# Patient Record
Sex: FEMALE | Race: WHITE | Employment: OTHER | URBAN - NONMETROPOLITAN AREA
[De-identification: names, ages, dates, MRNs, and addresses within clinical notes are randomized per-mention and may not be internally consistent; named-entity substitution may affect disease eponyms.]

---

## 2017-01-01 ENCOUNTER — HOSPITAL ENCOUNTER (OUTPATIENT)
Dept: CT IMAGING | Facility: HOSPITAL | Age: 72
Discharge: HOME | End: 2017-03-14
Attending: FAMILY MEDICINE | Admitting: FAMILY MEDICINE
Payer: COMMERCIAL

## 2017-01-01 ENCOUNTER — ONCOLOGY VISIT (OUTPATIENT)
Dept: ONCOLOGY | Facility: OTHER | Age: 72
End: 2017-01-01
Attending: INTERNAL MEDICINE
Payer: COMMERCIAL

## 2017-01-01 ENCOUNTER — TELEPHONE (OUTPATIENT)
Dept: FAMILY MEDICINE | Facility: OTHER | Age: 72
End: 2017-01-01

## 2017-01-01 ENCOUNTER — TELEPHONE (OUTPATIENT)
Dept: CARE COORDINATION | Facility: OTHER | Age: 72
End: 2017-01-01

## 2017-01-01 ENCOUNTER — HOSPITAL ENCOUNTER (OUTPATIENT)
Dept: INTERVENTIONAL RADIOLOGY/VASCULAR | Facility: HOSPITAL | Age: 72
Discharge: HOME | End: 2017-04-17
Attending: FAMILY MEDICINE | Admitting: FAMILY MEDICINE
Payer: COMMERCIAL

## 2017-01-01 ENCOUNTER — HOSPITAL ENCOUNTER (OUTPATIENT)
Dept: INTERVENTIONAL RADIOLOGY/VASCULAR | Facility: HOSPITAL | Age: 72
Discharge: HOME | End: 2017-03-30
Attending: FAMILY MEDICINE | Admitting: FAMILY MEDICINE
Payer: COMMERCIAL

## 2017-01-01 ENCOUNTER — TELEPHONE (OUTPATIENT)
Dept: INTERVENTIONAL RADIOLOGY/VASCULAR | Facility: HOSPITAL | Age: 72
End: 2017-01-01

## 2017-01-01 ENCOUNTER — HOSPITAL ENCOUNTER (OUTPATIENT)
Dept: PET IMAGING | Facility: HOSPITAL | Age: 72
Discharge: HOME | End: 2017-09-21
Attending: FAMILY MEDICINE | Admitting: FAMILY MEDICINE
Payer: COMMERCIAL

## 2017-01-01 ENCOUNTER — HOSPITAL ENCOUNTER (OUTPATIENT)
Facility: HOSPITAL | Age: 72
End: 2017-01-01
Payer: COMMERCIAL

## 2017-01-01 ENCOUNTER — HOSPITAL ENCOUNTER (EMERGENCY)
Facility: HOSPITAL | Age: 72
Discharge: HOME | End: 2017-09-08
Attending: FAMILY MEDICINE | Admitting: FAMILY MEDICINE
Payer: COMMERCIAL

## 2017-01-01 ENCOUNTER — OFFICE VISIT (OUTPATIENT)
Dept: FAMILY MEDICINE | Facility: OTHER | Age: 72
End: 2017-01-01
Attending: FAMILY MEDICINE
Payer: COMMERCIAL

## 2017-01-01 ENCOUNTER — HOSPITAL ENCOUNTER (EMERGENCY)
Facility: HOSPITAL | Age: 72
Discharge: HOME | End: 2017-03-07
Attending: PHYSICIAN ASSISTANT | Admitting: PHYSICIAN ASSISTANT
Payer: COMMERCIAL

## 2017-01-01 ENCOUNTER — DOCUMENTATION ONLY (OUTPATIENT)
Dept: FAMILY MEDICINE | Facility: OTHER | Age: 72
End: 2017-01-01

## 2017-01-01 ENCOUNTER — HOSPITAL PATHOLOGY (OUTPATIENT)
Dept: OTHER | Facility: CLINIC | Age: 72
End: 2017-01-01

## 2017-01-01 ENCOUNTER — HOSPITAL ENCOUNTER (OUTPATIENT)
Dept: GENERAL RADIOLOGY | Facility: HOSPITAL | Age: 72
End: 2017-10-10
Attending: RADIOLOGY
Payer: COMMERCIAL

## 2017-01-01 ENCOUNTER — HOSPITAL ENCOUNTER (OUTPATIENT)
Dept: MRI IMAGING | Facility: HOSPITAL | Age: 72
Discharge: HOME | End: 2017-10-31
Attending: INTERNAL MEDICINE | Admitting: INTERNAL MEDICINE
Payer: COMMERCIAL

## 2017-01-01 ENCOUNTER — ONCOLOGY VISIT (OUTPATIENT)
Dept: ONCOLOGY | Facility: OTHER | Age: 72
End: 2017-01-01
Attending: FAMILY MEDICINE
Payer: COMMERCIAL

## 2017-01-01 ENCOUNTER — HOSPITAL ENCOUNTER (OUTPATIENT)
Dept: GENERAL RADIOLOGY | Facility: HOSPITAL | Age: 72
Discharge: HOME | End: 2017-03-22
Attending: FAMILY MEDICINE | Admitting: FAMILY MEDICINE
Payer: COMMERCIAL

## 2017-01-01 ENCOUNTER — HOSPITAL ENCOUNTER (OUTPATIENT)
Dept: CT IMAGING | Facility: HOSPITAL | Age: 72
Discharge: HOME | End: 2017-10-10
Attending: FAMILY MEDICINE | Admitting: FAMILY MEDICINE
Payer: COMMERCIAL

## 2017-01-01 ENCOUNTER — MEDICAL CORRESPONDENCE (OUTPATIENT)
Dept: HEALTH INFORMATION MANAGEMENT | Facility: HOSPITAL | Age: 72
End: 2017-01-01

## 2017-01-01 VITALS
SYSTOLIC BLOOD PRESSURE: 155 MMHG | OXYGEN SATURATION: 92 % | HEART RATE: 110 BPM | BODY MASS INDEX: 41.6 KG/M2 | TEMPERATURE: 98.5 F | RESPIRATION RATE: 18 BRPM | WEIGHT: 250 LBS | DIASTOLIC BLOOD PRESSURE: 100 MMHG

## 2017-01-01 VITALS
HEIGHT: 65 IN | WEIGHT: 258 LBS | TEMPERATURE: 96.8 F | DIASTOLIC BLOOD PRESSURE: 70 MMHG | HEART RATE: 103 BPM | SYSTOLIC BLOOD PRESSURE: 132 MMHG | OXYGEN SATURATION: 96 % | BODY MASS INDEX: 42.99 KG/M2 | RESPIRATION RATE: 18 BRPM

## 2017-01-01 VITALS
RESPIRATION RATE: 20 BRPM | DIASTOLIC BLOOD PRESSURE: 72 MMHG | SYSTOLIC BLOOD PRESSURE: 124 MMHG | OXYGEN SATURATION: 91 % | HEART RATE: 110 BPM

## 2017-01-01 VITALS
RESPIRATION RATE: 20 BRPM | HEART RATE: 95 BPM | BODY MASS INDEX: 42.82 KG/M2 | SYSTOLIC BLOOD PRESSURE: 137 MMHG | WEIGHT: 257 LBS | OXYGEN SATURATION: 96 % | HEIGHT: 65 IN | TEMPERATURE: 98.3 F | DIASTOLIC BLOOD PRESSURE: 74 MMHG

## 2017-01-01 VITALS
HEART RATE: 105 BPM | OXYGEN SATURATION: 95 % | TEMPERATURE: 98.1 F | DIASTOLIC BLOOD PRESSURE: 78 MMHG | SYSTOLIC BLOOD PRESSURE: 138 MMHG

## 2017-01-01 VITALS
HEART RATE: 106 BPM | RESPIRATION RATE: 18 BRPM | DIASTOLIC BLOOD PRESSURE: 58 MMHG | SYSTOLIC BLOOD PRESSURE: 154 MMHG | TEMPERATURE: 98.1 F | OXYGEN SATURATION: 91 %

## 2017-01-01 VITALS
DIASTOLIC BLOOD PRESSURE: 69 MMHG | OXYGEN SATURATION: 96 % | RESPIRATION RATE: 18 BRPM | SYSTOLIC BLOOD PRESSURE: 148 MMHG | WEIGHT: 257 LBS | HEIGHT: 65 IN | TEMPERATURE: 97.3 F | BODY MASS INDEX: 42.82 KG/M2 | HEART RATE: 107 BPM

## 2017-01-01 VITALS
DIASTOLIC BLOOD PRESSURE: 82 MMHG | RESPIRATION RATE: 94 BRPM | HEART RATE: 110 BPM | SYSTOLIC BLOOD PRESSURE: 148 MMHG | HEIGHT: 65 IN | TEMPERATURE: 96.5 F

## 2017-01-01 VITALS
RESPIRATION RATE: 18 BRPM | DIASTOLIC BLOOD PRESSURE: 103 MMHG | OXYGEN SATURATION: 94 % | TEMPERATURE: 96.6 F | SYSTOLIC BLOOD PRESSURE: 142 MMHG | HEART RATE: 98 BPM

## 2017-01-01 DIAGNOSIS — C50.912 MALIGNANT NEOPLASM OF LEFT BREAST IN FEMALE, ESTROGEN RECEPTOR POSITIVE, UNSPECIFIED SITE OF BREAST (H): Primary | ICD-10-CM

## 2017-01-01 DIAGNOSIS — C34.90 MALIGNANT NEOPLASM OF LUNG, UNSPECIFIED LATERALITY, UNSPECIFIED PART OF LUNG (H): ICD-10-CM

## 2017-01-01 DIAGNOSIS — K59.01 SLOW TRANSIT CONSTIPATION: ICD-10-CM

## 2017-01-01 DIAGNOSIS — Z53.9 ERRONEOUS ENCOUNTER--DISREGARD: Primary | ICD-10-CM

## 2017-01-01 DIAGNOSIS — R91.1 SOLITARY PULMONARY NODULE: ICD-10-CM

## 2017-01-01 DIAGNOSIS — R91.1 LUNG NODULE: Primary | ICD-10-CM

## 2017-01-01 DIAGNOSIS — R91.8 PULMONARY NODULES: ICD-10-CM

## 2017-01-01 DIAGNOSIS — R91.8 OTHER NONSPECIFIC ABNORMAL FINDING OF LUNG FIELD: ICD-10-CM

## 2017-01-01 DIAGNOSIS — R91.1 SOLITARY PULMONARY NODULE: Primary | ICD-10-CM

## 2017-01-01 DIAGNOSIS — M48.062 SPINAL STENOSIS OF LUMBAR REGION WITH NEUROGENIC CLAUDICATION: Primary | ICD-10-CM

## 2017-01-01 DIAGNOSIS — M54.50 ACUTE MIDLINE LOW BACK PAIN WITHOUT SCIATICA: Primary | ICD-10-CM

## 2017-01-01 DIAGNOSIS — R93.89 ABNORMAL CAT SCAN: Primary | ICD-10-CM

## 2017-01-01 DIAGNOSIS — R91.8 ABNORMAL CT SCAN, LUNG: Primary | ICD-10-CM

## 2017-01-01 DIAGNOSIS — M48.061 SPINAL STENOSIS, LUMBAR: Primary | ICD-10-CM

## 2017-01-01 DIAGNOSIS — R03.0 ELEVATED BLOOD PRESSURE READING WITHOUT DIAGNOSIS OF HYPERTENSION: ICD-10-CM

## 2017-01-01 DIAGNOSIS — C34.91 MALIGNANT NEOPLASM OF RIGHT LUNG, UNSPECIFIED PART OF LUNG (H): Primary | ICD-10-CM

## 2017-01-01 DIAGNOSIS — R91.1 LUNG NODULE: ICD-10-CM

## 2017-01-01 DIAGNOSIS — Z17.0 MALIGNANT NEOPLASM OF LEFT BREAST IN FEMALE, ESTROGEN RECEPTOR POSITIVE, UNSPECIFIED SITE OF BREAST (H): Primary | ICD-10-CM

## 2017-01-01 DIAGNOSIS — I25.10 CORONARY ARTERY DISEASE INVOLVING NATIVE CORONARY ARTERY OF NATIVE HEART WITHOUT ANGINA PECTORIS: ICD-10-CM

## 2017-01-01 DIAGNOSIS — M46.1 SACROILIITIS (H): ICD-10-CM

## 2017-01-01 DIAGNOSIS — Z85.3 PERSONAL HISTORY OF MALIGNANT NEOPLASM OF BREAST: ICD-10-CM

## 2017-01-01 DIAGNOSIS — C34.90 MALIGNANT NEOPLASM OF LUNG, UNSPECIFIED LATERALITY, UNSPECIFIED PART OF LUNG (H): Primary | ICD-10-CM

## 2017-01-01 DIAGNOSIS — M54.50 ACUTE MIDLINE LOW BACK PAIN WITHOUT SCIATICA: ICD-10-CM

## 2017-01-01 DIAGNOSIS — F17.200 TOBACCO DEPENDENCE SYNDROME: ICD-10-CM

## 2017-01-01 DIAGNOSIS — R60.0 PEDAL EDEMA: ICD-10-CM

## 2017-01-01 LAB
ALBUMIN SERPL-MCNC: 3.5 G/DL (ref 3.4–5)
ALBUMIN SERPL-MCNC: 3.5 G/DL (ref 3.4–5)
ALP SERPL-CCNC: 102 U/L (ref 40–150)
ALP SERPL-CCNC: 106 U/L (ref 40–150)
ALT SERPL W P-5'-P-CCNC: 56 U/L (ref 0–50)
ALT SERPL W P-5'-P-CCNC: 56 U/L (ref 0–50)
ANION GAP SERPL CALCULATED.3IONS-SCNC: 11 MMOL/L (ref 3–14)
ANION GAP SERPL CALCULATED.3IONS-SCNC: 4 MMOL/L (ref 3–14)
APTT PPP: 36 SEC (ref 24–37)
AST SERPL W P-5'-P-CCNC: 54 U/L (ref 0–45)
AST SERPL W P-5'-P-CCNC: 56 U/L (ref 0–45)
BASOPHILS # BLD AUTO: 0.1 10E9/L (ref 0–0.2)
BASOPHILS # BLD AUTO: 0.1 10E9/L (ref 0–0.2)
BASOPHILS NFR BLD AUTO: 1 %
BASOPHILS NFR BLD AUTO: 1.2 %
BILIRUB SERPL-MCNC: 0.6 MG/DL (ref 0.2–1.3)
BILIRUB SERPL-MCNC: 0.9 MG/DL (ref 0.2–1.3)
BUN SERPL-MCNC: 10 MG/DL (ref 7–30)
BUN SERPL-MCNC: 11 MG/DL (ref 7–30)
CALCIUM SERPL-MCNC: 8.8 MG/DL (ref 8.5–10.1)
CALCIUM SERPL-MCNC: 9.1 MG/DL (ref 8.5–10.1)
CANCER AG27-29 SERPL-ACNC: 60 U/ML (ref 0–39)
CEA SERPL-MCNC: 1774.1 UG/L (ref 0–2.5)
CHLORIDE SERPL-SCNC: 100 MMOL/L (ref 94–109)
CHLORIDE SERPL-SCNC: 101 MMOL/L (ref 94–109)
CLOSURE TME COLL+EPINEP BLD: NORMAL SEC
CO2 SERPL-SCNC: 27 MMOL/L (ref 20–32)
CO2 SERPL-SCNC: 33 MMOL/L (ref 20–32)
COPATH REPORT: NORMAL
COPATH REPORT: NORMAL
CREAT SERPL-MCNC: 0.6 MG/DL (ref 0.52–1.04)
CREAT SERPL-MCNC: 0.68 MG/DL (ref 0.52–1.04)
DIFFERENTIAL METHOD BLD: ABNORMAL
DIFFERENTIAL METHOD BLD: ABNORMAL
EOSINOPHIL # BLD AUTO: 0.1 10E9/L (ref 0–0.7)
EOSINOPHIL # BLD AUTO: 0.3 10E9/L (ref 0–0.7)
EOSINOPHIL NFR BLD AUTO: 1.6 %
EOSINOPHIL NFR BLD AUTO: 3.1 %
ERYTHROCYTE [DISTWIDTH] IN BLOOD BY AUTOMATED COUNT: 13.2 % (ref 10–15)
ERYTHROCYTE [DISTWIDTH] IN BLOOD BY AUTOMATED COUNT: 13.6 % (ref 10–15)
ERYTHROCYTE [DISTWIDTH] IN BLOOD BY AUTOMATED COUNT: 13.7 % (ref 10–15)
GFR SERPL CREATININE-BSD FRML MDRD: 85 ML/MIN/1.7M2
GFR SERPL CREATININE-BSD FRML MDRD: >90 ML/MIN/1.7M2
GLUCOSE SERPL-MCNC: 116 MG/DL (ref 70–99)
GLUCOSE SERPL-MCNC: 136 MG/DL (ref 70–99)
HCT VFR BLD AUTO: 45 % (ref 35–47)
HCT VFR BLD AUTO: 46.3 % (ref 35–47)
HCT VFR BLD AUTO: 46.7 % (ref 35–47)
HGB BLD-MCNC: 15.7 G/DL (ref 11.7–15.7)
HGB BLD-MCNC: 15.8 G/DL (ref 11.7–15.7)
HGB BLD-MCNC: 16.3 G/DL (ref 11.7–15.7)
IMM GRANULOCYTES # BLD: 0 10E9/L (ref 0–0.4)
IMM GRANULOCYTES # BLD: 0.1 10E9/L (ref 0–0.4)
IMM GRANULOCYTES NFR BLD: 0.2 %
IMM GRANULOCYTES NFR BLD: 0.6 %
INR PPP: 1.04 (ref 0.8–1.2)
LACTATE SERPL-SCNC: 2 MMOL/L (ref 0.4–2)
LDH SERPL L TO P-CCNC: 277 U/L (ref 81–234)
LYMPHOCYTES # BLD AUTO: 1.8 10E9/L (ref 0.8–5.3)
LYMPHOCYTES # BLD AUTO: 1.8 10E9/L (ref 0.8–5.3)
LYMPHOCYTES NFR BLD AUTO: 21.3 %
LYMPHOCYTES NFR BLD AUTO: 22 %
MCH RBC QN AUTO: 29.5 PG (ref 26.5–33)
MCH RBC QN AUTO: 29.8 PG (ref 26.5–33)
MCH RBC QN AUTO: 30.6 PG (ref 26.5–33)
MCHC RBC AUTO-ENTMCNC: 33.8 G/DL (ref 31.5–36.5)
MCHC RBC AUTO-ENTMCNC: 34.9 G/DL (ref 31.5–36.5)
MCHC RBC AUTO-ENTMCNC: 35.2 G/DL (ref 31.5–36.5)
MCV RBC AUTO: 85 FL (ref 78–100)
MCV RBC AUTO: 87 FL (ref 78–100)
MCV RBC AUTO: 87 FL (ref 78–100)
MONOCYTES # BLD AUTO: 0.5 10E9/L (ref 0–1.3)
MONOCYTES # BLD AUTO: 0.6 10E9/L (ref 0–1.3)
MONOCYTES NFR BLD AUTO: 5.8 %
MONOCYTES NFR BLD AUTO: 6.9 %
NEUTROPHILS # BLD AUTO: 5.5 10E9/L (ref 1.6–8.3)
NEUTROPHILS # BLD AUTO: 5.9 10E9/L (ref 1.6–8.3)
NEUTROPHILS NFR BLD AUTO: 66.4 %
NEUTROPHILS NFR BLD AUTO: 69.9 %
NRBC # BLD AUTO: 0 10*3/UL
NRBC # BLD AUTO: 0 10*3/UL
NRBC BLD AUTO-RTO: 0 /100
NRBC BLD AUTO-RTO: 0 /100
NT-PROBNP SERPL-MCNC: 136 PG/ML (ref 0–900)
PLATELET # BLD AUTO: 198 10E9/L (ref 150–450)
PLATELET # BLD AUTO: 204 10E9/L (ref 150–450)
PLATELET # BLD AUTO: 222 10E9/L (ref 150–450)
POTASSIUM SERPL-SCNC: 3.9 MMOL/L (ref 3.4–5.3)
POTASSIUM SERPL-SCNC: 4.1 MMOL/L (ref 3.4–5.3)
PROT SERPL-MCNC: 7.7 G/DL (ref 6.8–8.8)
PROT SERPL-MCNC: 7.8 G/DL (ref 6.8–8.8)
RBC # BLD AUTO: 5.27 10E12/L (ref 3.8–5.2)
RBC # BLD AUTO: 5.33 10E12/L (ref 3.8–5.2)
RBC # BLD AUTO: 5.35 10E12/L (ref 3.8–5.2)
SODIUM SERPL-SCNC: 137 MMOL/L (ref 133–144)
SODIUM SERPL-SCNC: 139 MMOL/L (ref 133–144)
TROPONIN I SERPL-MCNC: <0.015 UG/L (ref 0–0.04)
WBC # BLD AUTO: 8.3 10E9/L (ref 4–11)
WBC # BLD AUTO: 8.5 10E9/L (ref 4–11)
WBC # BLD AUTO: 9.2 10E9/L (ref 4–11)

## 2017-01-01 PROCEDURE — 99285 EMERGENCY DEPT VISIT HI MDM: CPT | Performed by: FAMILY MEDICINE

## 2017-01-01 PROCEDURE — A9552 F18 FDG: HCPCS | Mod: TC

## 2017-01-01 PROCEDURE — 80053 COMPREHEN METABOLIC PANEL: CPT | Mod: ZL | Performed by: INTERNAL MEDICINE

## 2017-01-01 PROCEDURE — 99214 OFFICE O/P EST MOD 30 MIN: CPT | Performed by: FAMILY MEDICINE

## 2017-01-01 PROCEDURE — 99000 SPECIMEN HANDLING OFFICE-LAB: CPT | Performed by: INTERNAL MEDICINE

## 2017-01-01 PROCEDURE — 36415 COLL VENOUS BLD VENIPUNCTURE: CPT | Performed by: RADIOLOGY

## 2017-01-01 PROCEDURE — 85025 COMPLETE CBC W/AUTO DIFF WBC: CPT | Performed by: FAMILY MEDICINE

## 2017-01-01 PROCEDURE — 99212 OFFICE O/P EST SF 10 MIN: CPT

## 2017-01-01 PROCEDURE — 88341 IMHCHEM/IMCYTCHM EA ADD ANTB: CPT | Mod: TC | Performed by: FAMILY MEDICINE

## 2017-01-01 PROCEDURE — 99213 OFFICE O/P EST LOW 20 MIN: CPT | Performed by: PHYSICIAN ASSISTANT

## 2017-01-01 PROCEDURE — 88173 CYTOPATH EVAL FNA REPORT: CPT | Mod: TC | Performed by: FAMILY MEDICINE

## 2017-01-01 PROCEDURE — 72100 X-RAY EXAM L-S SPINE 2/3 VWS: CPT | Mod: TC

## 2017-01-01 PROCEDURE — 36415 COLL VENOUS BLD VENIPUNCTURE: CPT | Performed by: FAMILY MEDICINE

## 2017-01-01 PROCEDURE — 25000128 H RX IP 250 OP 636: Performed by: RADIOLOGY

## 2017-01-01 PROCEDURE — 93005 ELECTROCARDIOGRAM TRACING: CPT

## 2017-01-01 PROCEDURE — 72131 CT LUMBAR SPINE W/O DYE: CPT | Mod: TC

## 2017-01-01 PROCEDURE — 85730 THROMBOPLASTIN TIME PARTIAL: CPT | Performed by: RADIOLOGY

## 2017-01-01 PROCEDURE — 83615 LACTATE (LD) (LDH) ENZYME: CPT | Mod: ZL | Performed by: INTERNAL MEDICINE

## 2017-01-01 PROCEDURE — 99213 OFFICE O/P EST LOW 20 MIN: CPT

## 2017-01-01 PROCEDURE — 85025 COMPLETE CBC W/AUTO DIFF WBC: CPT | Mod: ZL | Performed by: INTERNAL MEDICINE

## 2017-01-01 PROCEDURE — 62323 NJX INTERLAMINAR LMBR/SAC: CPT | Mod: TC

## 2017-01-01 PROCEDURE — 99212 OFFICE O/P EST SF 10 MIN: CPT | Mod: TC

## 2017-01-01 PROCEDURE — 93010 ELECTROCARDIOGRAM REPORT: CPT | Performed by: INTERNAL MEDICINE

## 2017-01-01 PROCEDURE — 88360 TUMOR IMMUNOHISTOCHEM/MANUAL: CPT | Mod: TC | Performed by: FAMILY MEDICINE

## 2017-01-01 PROCEDURE — 85027 COMPLETE CBC AUTOMATED: CPT | Performed by: RADIOLOGY

## 2017-01-01 PROCEDURE — 84484 ASSAY OF TROPONIN QUANT: CPT | Performed by: FAMILY MEDICINE

## 2017-01-01 PROCEDURE — 88341 IMHCHEM/IMCYTCHM EA ADD ANTB: CPT | Mod: TC,59 | Performed by: FAMILY MEDICINE

## 2017-01-01 PROCEDURE — 74177 CT ABD & PELVIS W/CONTRAST: CPT | Mod: TC

## 2017-01-01 PROCEDURE — 99205 OFFICE O/P NEW HI 60 MIN: CPT | Performed by: INTERNAL MEDICINE

## 2017-01-01 PROCEDURE — 83880 ASSAY OF NATRIURETIC PEPTIDE: CPT | Performed by: FAMILY MEDICINE

## 2017-01-01 PROCEDURE — 78815 PET IMAGE W/CT SKULL-THIGH: CPT | Mod: TC

## 2017-01-01 PROCEDURE — 99213 OFFICE O/P EST LOW 20 MIN: CPT | Performed by: FAMILY MEDICINE

## 2017-01-01 PROCEDURE — 25000128 H RX IP 250 OP 636: Performed by: FAMILY MEDICINE

## 2017-01-01 PROCEDURE — 70553 MRI BRAIN STEM W/O & W/DYE: CPT | Mod: TC

## 2017-01-01 PROCEDURE — 85610 PROTHROMBIN TIME: CPT | Performed by: RADIOLOGY

## 2017-01-01 PROCEDURE — 96361 HYDRATE IV INFUSION ADD-ON: CPT

## 2017-01-01 PROCEDURE — 88342 IMHCHEM/IMCYTCHM 1ST ANTB: CPT | Mod: TC | Performed by: FAMILY MEDICINE

## 2017-01-01 PROCEDURE — 99285 EMERGENCY DEPT VISIT HI MDM: CPT | Mod: 25

## 2017-01-01 PROCEDURE — 99212 OFFICE O/P EST SF 10 MIN: CPT | Mod: 25

## 2017-01-01 PROCEDURE — 86300 IMMUNOASSAY TUMOR CA 15-3: CPT | Mod: ZL | Performed by: INTERNAL MEDICINE

## 2017-01-01 PROCEDURE — 83605 ASSAY OF LACTIC ACID: CPT | Performed by: FAMILY MEDICINE

## 2017-01-01 PROCEDURE — 88305 TISSUE EXAM BY PATHOLOGIST: CPT | Mod: TC | Performed by: FAMILY MEDICINE

## 2017-01-01 PROCEDURE — 00000155 ZZHCL STATISTIC H-CELL BLOCK W/STAIN: Performed by: FAMILY MEDICINE

## 2017-01-01 PROCEDURE — 99215 OFFICE O/P EST HI 40 MIN: CPT | Performed by: INTERNAL MEDICINE

## 2017-01-01 PROCEDURE — 25000132 ZZH RX MED GY IP 250 OP 250 PS 637: Performed by: FAMILY MEDICINE

## 2017-01-01 PROCEDURE — 85576 BLOOD PLATELET AGGREGATION: CPT | Performed by: RADIOLOGY

## 2017-01-01 PROCEDURE — 96360 HYDRATION IV INFUSION INIT: CPT | Mod: 59

## 2017-01-01 PROCEDURE — 71020 ZZHC CHEST TWO VIEWS, FRONT/LAT: CPT | Mod: TC

## 2017-01-01 PROCEDURE — 80053 COMPREHEN METABOLIC PANEL: CPT | Performed by: FAMILY MEDICINE

## 2017-01-01 PROCEDURE — A9585 GADOBUTROL INJECTION: HCPCS | Performed by: RADIOLOGY

## 2017-01-01 PROCEDURE — 36415 COLL VENOUS BLD VENIPUNCTURE: CPT | Mod: ZL | Performed by: INTERNAL MEDICINE

## 2017-01-01 PROCEDURE — 82378 CARCINOEMBRYONIC ANTIGEN: CPT | Mod: ZL | Performed by: INTERNAL MEDICINE

## 2017-01-01 RX ORDER — MORPHINE SULFATE 2 MG/ML
INJECTION, SOLUTION INTRAMUSCULAR; INTRAVENOUS
Status: COMPLETED
Start: 2017-01-01 | End: 2017-01-01

## 2017-01-01 RX ORDER — SODIUM CHLORIDE 9 MG/ML
INJECTION, SOLUTION INTRAVENOUS CONTINUOUS
Status: DISCONTINUED | OUTPATIENT
Start: 2017-01-01 | End: 2017-01-01 | Stop reason: HOSPADM

## 2017-01-01 RX ORDER — CYCLOBENZAPRINE HCL 10 MG
TABLET ORAL
Qty: 30 TABLET | Refills: 0 | Status: SHIPPED | OUTPATIENT
Start: 2017-01-01 | End: 2017-01-01

## 2017-01-01 RX ORDER — IOPAMIDOL 612 MG/ML
15 INJECTION, SOLUTION INTRATHECAL ONCE
Status: COMPLETED | OUTPATIENT
Start: 2017-01-01 | End: 2017-01-01

## 2017-01-01 RX ORDER — POLYETHYLENE GLYCOL 3350 17 G/17G
1 POWDER, FOR SOLUTION ORAL DAILY
Qty: 527 G | Refills: 0 | Status: SHIPPED | OUTPATIENT
Start: 2017-01-01 | End: 2017-01-01

## 2017-01-01 RX ORDER — METHYLPREDNISOLONE ACETATE 80 MG/ML
80 INJECTION, SUSPENSION INTRA-ARTICULAR; INTRALESIONAL; INTRAMUSCULAR; SOFT TISSUE ONCE
Status: COMPLETED | OUTPATIENT
Start: 2017-01-01 | End: 2017-01-01

## 2017-01-01 RX ORDER — IBUPROFEN 600 MG/1
600 TABLET, FILM COATED ORAL ONCE
Status: COMPLETED | OUTPATIENT
Start: 2017-01-01 | End: 2017-01-01

## 2017-01-01 RX ORDER — HYDROCODONE BITARTRATE AND ACETAMINOPHEN 5; 325 MG/1; MG/1
1 TABLET ORAL EVERY 6 HOURS PRN
Qty: 60 TABLET | Refills: 0 | Status: SHIPPED | OUTPATIENT
Start: 2017-01-01

## 2017-01-01 RX ORDER — LIDOCAINE HYDROCHLORIDE 10 MG/ML
10 INJECTION, SOLUTION EPIDURAL; INFILTRATION; INTRACAUDAL; PERINEURAL ONCE
Status: COMPLETED | OUTPATIENT
Start: 2017-01-01 | End: 2017-01-01

## 2017-01-01 RX ORDER — LIDOCAINE HYDROCHLORIDE 10 MG/ML
10 INJECTION, SOLUTION EPIDURAL; INFILTRATION; INTRACAUDAL; PERINEURAL ONCE
Status: CANCELLED | OUTPATIENT
Start: 2017-01-01

## 2017-01-01 RX ORDER — GADOBUTROL 604.72 MG/ML
10 INJECTION INTRAVENOUS ONCE
Status: COMPLETED | OUTPATIENT
Start: 2017-01-01 | End: 2017-01-01

## 2017-01-01 RX ORDER — LIDOCAINE 40 MG/G
CREAM TOPICAL
Status: CANCELLED | OUTPATIENT
Start: 2017-01-01

## 2017-01-01 RX ORDER — METHYLPREDNISOLONE ACETATE 80 MG/ML
INJECTION, SUSPENSION INTRA-ARTICULAR; INTRALESIONAL; INTRAMUSCULAR; SOFT TISSUE
Status: DISCONTINUED
Start: 2017-01-01 | End: 2017-01-01 | Stop reason: HOSPADM

## 2017-01-01 RX ORDER — LIDOCAINE HYDROCHLORIDE 10 MG/ML
INJECTION, SOLUTION EPIDURAL; INFILTRATION; INTRACAUDAL; PERINEURAL
Status: DISPENSED
Start: 2017-01-01 | End: 2017-01-01

## 2017-01-01 RX ORDER — ASPIRIN 81 MG/1
324 TABLET, CHEWABLE ORAL ONCE
Status: DISCONTINUED | OUTPATIENT
Start: 2017-01-01 | End: 2017-01-01

## 2017-01-01 RX ORDER — TRAMADOL HYDROCHLORIDE 50 MG/1
TABLET ORAL
Qty: 10 TABLET | Refills: 0 | Status: SHIPPED | OUTPATIENT
Start: 2017-01-01 | End: 2017-01-01

## 2017-01-01 RX ORDER — METHYLPREDNISOLONE ACETATE 80 MG/ML
INJECTION, SUSPENSION INTRA-ARTICULAR; INTRALESIONAL; INTRAMUSCULAR; SOFT TISSUE
Status: DISPENSED
Start: 2017-01-01 | End: 2017-01-01

## 2017-01-01 RX ORDER — IOPAMIDOL 612 MG/ML
100 INJECTION, SOLUTION INTRAVASCULAR ONCE
Status: COMPLETED | OUTPATIENT
Start: 2017-01-01 | End: 2017-01-01

## 2017-01-01 RX ORDER — HYDROCHLOROTHIAZIDE 12.5 MG/1
12.5 CAPSULE ORAL DAILY
Qty: 30 CAPSULE | Refills: 3 | Status: SHIPPED | OUTPATIENT
Start: 2017-01-01

## 2017-01-01 RX ORDER — HYDROCODONE BITARTRATE AND ACETAMINOPHEN 5; 325 MG/1; MG/1
1 TABLET ORAL EVERY 6 HOURS PRN
Qty: 60 TABLET | Refills: 0 | Status: SHIPPED | OUTPATIENT
Start: 2017-01-01 | End: 2017-01-01

## 2017-01-01 RX ORDER — LIDOCAINE 40 MG/G
CREAM TOPICAL
Status: DISCONTINUED | OUTPATIENT
Start: 2017-01-01 | End: 2017-01-01 | Stop reason: HOSPADM

## 2017-01-01 RX ORDER — HYDROCODONE BITARTRATE AND ACETAMINOPHEN 5; 325 MG/1; MG/1
1 TABLET ORAL EVERY 6 HOURS PRN
COMMUNITY
End: 2017-01-01

## 2017-01-01 RX ORDER — NITROGLYCERIN 0.4 MG/1
TABLET SUBLINGUAL
Qty: 25 TABLET | Refills: 3 | Status: SHIPPED | OUTPATIENT
Start: 2017-01-01

## 2017-01-01 RX ORDER — LIDOCAINE HYDROCHLORIDE 10 MG/ML
10 INJECTION, SOLUTION EPIDURAL; INFILTRATION; INTRACAUDAL; PERINEURAL ONCE
Status: DISCONTINUED | OUTPATIENT
Start: 2017-01-01 | End: 2017-01-01 | Stop reason: HOSPADM

## 2017-01-01 RX ADMIN — METHYLPREDNISOLONE ACETATE 80 MG: 80 INJECTION, SUSPENSION INTRA-ARTICULAR; INTRALESIONAL; INTRAMUSCULAR; SOFT TISSUE at 16:06

## 2017-01-01 RX ADMIN — GADOBUTROL 10 ML: 604.72 INJECTION INTRAVENOUS at 10:28

## 2017-01-01 RX ADMIN — IOPAMIDOL 100 ML: 612 INJECTION, SOLUTION INTRAVASCULAR at 11:48

## 2017-01-01 RX ADMIN — MORPHINE SULFATE 2 MG: 2 INJECTION, SOLUTION INTRAMUSCULAR; INTRAVENOUS at 09:21

## 2017-01-01 RX ADMIN — METHYLPREDNISOLONE ACETATE 80 MG: 80 INJECTION, SUSPENSION INTRA-ARTICULAR; INTRALESIONAL; INTRAMUSCULAR; SOFT TISSUE at 12:02

## 2017-01-01 RX ADMIN — IBUPROFEN 600 MG: 600 TABLET ORAL at 11:33

## 2017-01-01 RX ADMIN — LIDOCAINE HYDROCHLORIDE 70 MG: 10 INJECTION, SOLUTION EPIDURAL; INFILTRATION; INTRACAUDAL; PERINEURAL at 09:34

## 2017-01-01 RX ADMIN — SODIUM CHLORIDE: 9 INJECTION, SOLUTION INTRAVENOUS at 11:13

## 2017-01-01 RX ADMIN — IOPAMIDOL 3 ML: 612 INJECTION, SOLUTION INTRATHECAL at 16:06

## 2017-01-01 RX ADMIN — IOPAMIDOL 3 ML: 612 INJECTION, SOLUTION INTRATHECAL at 12:02

## 2017-01-01 ASSESSMENT — ANXIETY QUESTIONNAIRES
2. NOT BEING ABLE TO STOP OR CONTROL WORRYING: NOT AT ALL
1. FEELING NERVOUS, ANXIOUS, OR ON EDGE: NOT AT ALL
7. FEELING AFRAID AS IF SOMETHING AWFUL MIGHT HAPPEN: NOT AT ALL
GAD7 TOTAL SCORE: 0
4. TROUBLE RELAXING: NOT AT ALL
1. FEELING NERVOUS, ANXIOUS, OR ON EDGE: NOT AT ALL
5. BEING SO RESTLESS THAT IT IS HARD TO SIT STILL: NOT AT ALL
GAD7 TOTAL SCORE: 0
IF YOU CHECKED OFF ANY PROBLEMS ON THIS QUESTIONNAIRE, HOW DIFFICULT HAVE THESE PROBLEMS MADE IT FOR YOU TO DO YOUR WORK, TAKE CARE OF THINGS AT HOME, OR GET ALONG WITH OTHER PEOPLE: NOT DIFFICULT AT ALL
3. WORRYING TOO MUCH ABOUT DIFFERENT THINGS: NOT AT ALL
5. BEING SO RESTLESS THAT IT IS HARD TO SIT STILL: NOT AT ALL
7. FEELING AFRAID AS IF SOMETHING AWFUL MIGHT HAPPEN: NOT AT ALL
3. WORRYING TOO MUCH ABOUT DIFFERENT THINGS: NOT AT ALL
GAD7 TOTAL SCORE: 0
6. BECOMING EASILY ANNOYED OR IRRITABLE: NOT AT ALL
GAD7 TOTAL SCORE: 0
2. NOT BEING ABLE TO STOP OR CONTROL WORRYING: NOT AT ALL
4. TROUBLE RELAXING: NOT AT ALL
6. BECOMING EASILY ANNOYED OR IRRITABLE: NOT AT ALL

## 2017-01-01 ASSESSMENT — PATIENT HEALTH QUESTIONNAIRE - PHQ9
SUM OF ALL RESPONSES TO PHQ QUESTIONS 1-9: 0
SUM OF ALL RESPONSES TO PHQ QUESTIONS 1-9: 0
SUM OF ALL RESPONSES TO PHQ QUESTIONS 1-9: 2
SUM OF ALL RESPONSES TO PHQ QUESTIONS 1-9: 1

## 2017-01-01 ASSESSMENT — ENCOUNTER SYMPTOMS
CONSTITUTIONAL NEGATIVE: 1
NAUSEA: 1
CONSTIPATION: 1
VOMITING: 0
SHORTNESS OF BREATH: 1
NEUROLOGICAL NEGATIVE: 1
PHOTOPHOBIA: 0
DIAPHORESIS: 0
FATIGUE: 1
PSYCHIATRIC NEGATIVE: 1
ABDOMINAL PAIN: 1
MYALGIAS: 1
BACK PAIN: 1
ACTIVITY CHANGE: 1
WHEEZING: 0
COUGH: 0
DIARRHEA: 0
BACK PAIN: 1
PSYCHIATRIC NEGATIVE: 1
CARDIOVASCULAR NEGATIVE: 1
NEUROLOGICAL NEGATIVE: 1
DYSURIA: 0

## 2017-01-01 ASSESSMENT — PAIN SCALES - GENERAL
PAINLEVEL: MODERATE PAIN (5)
PAINLEVEL: NO PAIN (0)
PAINLEVEL: EXTREME PAIN (9)
PAINLEVEL: MODERATE PAIN (5)
PAINLEVEL: SEVERE PAIN (6)
PAINLEVEL: NO PAIN (0)

## 2017-01-18 ENCOUNTER — HOSPITAL ENCOUNTER (OUTPATIENT)
Facility: HOSPITAL | Age: 72
Setting detail: OBSERVATION
Discharge: HOME | DRG: 310 | End: 2017-01-19
Attending: PHYSICIAN ASSISTANT | Admitting: HOSPITALIST
Payer: COMMERCIAL

## 2017-01-18 DIAGNOSIS — I25.10 CORONARY ARTERY DISEASE INVOLVING NATIVE CORONARY ARTERY OF NATIVE HEART WITHOUT ANGINA PECTORIS: ICD-10-CM

## 2017-01-18 DIAGNOSIS — M51.379 DEGENERATION OF INTERVERTEBRAL DISC OF LUMBOSACRAL REGION: Primary | ICD-10-CM

## 2017-01-18 DIAGNOSIS — I48.91 ATRIAL FIBRILLATION WITH RAPID VENTRICULAR RESPONSE (H): ICD-10-CM

## 2017-01-18 DIAGNOSIS — R07.89 ATYPICAL CHEST PAIN: ICD-10-CM

## 2017-01-18 DIAGNOSIS — J32.9 CHRONIC SINUSITIS, UNSPECIFIED LOCATION: ICD-10-CM

## 2017-01-18 DIAGNOSIS — I48.0 PAROXYSMAL ATRIAL FIBRILLATION (H): ICD-10-CM

## 2017-01-18 LAB
ALBUMIN SERPL-MCNC: 3.5 G/DL (ref 3.4–5)
ALP SERPL-CCNC: 106 U/L (ref 40–150)
ALT SERPL W P-5'-P-CCNC: 57 U/L (ref 0–50)
ANION GAP SERPL CALCULATED.3IONS-SCNC: 11 MMOL/L (ref 3–14)
AST SERPL W P-5'-P-CCNC: 57 U/L (ref 0–45)
BASOPHILS # BLD AUTO: 0.1 10E9/L (ref 0–0.2)
BASOPHILS NFR BLD AUTO: 0.9 %
BILIRUB SERPL-MCNC: 0.6 MG/DL (ref 0.2–1.3)
BUN SERPL-MCNC: 14 MG/DL (ref 7–30)
CALCIUM SERPL-MCNC: 8.8 MG/DL (ref 8.5–10.1)
CHLORIDE SERPL-SCNC: 100 MMOL/L (ref 94–109)
CO2 SERPL-SCNC: 27 MMOL/L (ref 20–32)
CREAT SERPL-MCNC: 0.66 MG/DL (ref 0.52–1.04)
DIFFERENTIAL METHOD BLD: ABNORMAL
EOSINOPHIL # BLD AUTO: 0.3 10E9/L (ref 0–0.7)
EOSINOPHIL NFR BLD AUTO: 2.8 %
ERYTHROCYTE [DISTWIDTH] IN BLOOD BY AUTOMATED COUNT: 13.2 % (ref 10–15)
EST. AVERAGE GLUCOSE BLD GHB EST-MCNC: 137 MG/DL
GFR SERPL CREATININE-BSD FRML MDRD: 87 ML/MIN/1.7M2
GLUCOSE SERPL-MCNC: 173 MG/DL (ref 70–99)
HBA1C MFR BLD: 6.4 % (ref 4.3–6)
HCT VFR BLD AUTO: 46.1 % (ref 35–47)
HGB BLD-MCNC: 16 G/DL (ref 11.7–15.7)
IMM GRANULOCYTES # BLD: 0 10E9/L (ref 0–0.4)
IMM GRANULOCYTES NFR BLD: 0.4 %
LYMPHOCYTES # BLD AUTO: 2.6 10E9/L (ref 0.8–5.3)
LYMPHOCYTES NFR BLD AUTO: 24.7 %
MAGNESIUM SERPL-MCNC: 2.1 MG/DL (ref 1.6–2.3)
MCH RBC QN AUTO: 29.8 PG (ref 26.5–33)
MCHC RBC AUTO-ENTMCNC: 34.7 G/DL (ref 31.5–36.5)
MCV RBC AUTO: 86 FL (ref 78–100)
MONOCYTES # BLD AUTO: 0.7 10E9/L (ref 0–1.3)
MONOCYTES NFR BLD AUTO: 6.3 %
NEUTROPHILS # BLD AUTO: 6.9 10E9/L (ref 1.6–8.3)
NEUTROPHILS NFR BLD AUTO: 64.9 %
NRBC # BLD AUTO: 0 10*3/UL
NRBC BLD AUTO-RTO: 0 /100
PLATELET # BLD AUTO: 205 10E9/L (ref 150–450)
POTASSIUM SERPL-SCNC: 4.2 MMOL/L (ref 3.4–5.3)
PROT SERPL-MCNC: 7.8 G/DL (ref 6.8–8.8)
RBC # BLD AUTO: 5.37 10E12/L (ref 3.8–5.2)
SODIUM SERPL-SCNC: 138 MMOL/L (ref 133–144)
TROPONIN I SERPL-MCNC: NORMAL UG/L (ref 0–0.04)
TSH SERPL DL<=0.05 MIU/L-ACNC: 2.22 MU/L (ref 0.4–4)
WBC # BLD AUTO: 10.6 10E9/L (ref 4–11)

## 2017-01-18 PROCEDURE — 83036 HEMOGLOBIN GLYCOSYLATED A1C: CPT | Performed by: HOSPITALIST

## 2017-01-18 PROCEDURE — 99285 EMERGENCY DEPT VISIT HI MDM: CPT | Mod: 25

## 2017-01-18 PROCEDURE — 93005 ELECTROCARDIOGRAM TRACING: CPT

## 2017-01-18 PROCEDURE — 25000128 H RX IP 250 OP 636: Performed by: PHYSICIAN ASSISTANT

## 2017-01-18 PROCEDURE — 96374 THER/PROPH/DIAG INJ IV PUSH: CPT

## 2017-01-18 PROCEDURE — 85025 COMPLETE CBC W/AUTO DIFF WBC: CPT | Performed by: PHYSICIAN ASSISTANT

## 2017-01-18 PROCEDURE — 83735 ASSAY OF MAGNESIUM: CPT | Performed by: HOSPITALIST

## 2017-01-18 PROCEDURE — 25000125 ZZHC RX 250: Performed by: PHYSICIAN ASSISTANT

## 2017-01-18 PROCEDURE — 25000132 ZZH RX MED GY IP 250 OP 250 PS 637: Performed by: HOSPITALIST

## 2017-01-18 PROCEDURE — 71020 ZZHC CHEST TWO VIEWS, FRONT/LAT: CPT | Mod: TC

## 2017-01-18 PROCEDURE — 93010 ELECTROCARDIOGRAM REPORT: CPT | Performed by: INTERNAL MEDICINE

## 2017-01-18 PROCEDURE — 40000786 ZZHCL STATISTIC ACTIVE MRSA SURVEILLANCE CULTURE: Performed by: HOSPITALIST

## 2017-01-18 PROCEDURE — 84484 ASSAY OF TROPONIN QUANT: CPT | Performed by: PHYSICIAN ASSISTANT

## 2017-01-18 PROCEDURE — 20000003 ZZH R&B ICU

## 2017-01-18 PROCEDURE — 99284 EMERGENCY DEPT VISIT MOD MDM: CPT | Performed by: PHYSICIAN ASSISTANT

## 2017-01-18 PROCEDURE — 96376 TX/PRO/DX INJ SAME DRUG ADON: CPT

## 2017-01-18 PROCEDURE — 99220 ZZC INITIAL OBSERVATION CARE,LEVL III: CPT | Performed by: HOSPITALIST

## 2017-01-18 PROCEDURE — 40000788 ZZHCL STATISTIC ESTIMATED AVERAGE GLUCOSE: Performed by: HOSPITALIST

## 2017-01-18 PROCEDURE — 25000125 ZZHC RX 250

## 2017-01-18 PROCEDURE — 80053 COMPREHEN METABOLIC PANEL: CPT | Performed by: PHYSICIAN ASSISTANT

## 2017-01-18 PROCEDURE — 84443 ASSAY THYROID STIM HORMONE: CPT | Performed by: HOSPITALIST

## 2017-01-18 PROCEDURE — 25000125 ZZHC RX 250: Performed by: HOSPITALIST

## 2017-01-18 RX ORDER — LIDOCAINE 40 MG/G
CREAM TOPICAL
Status: DISCONTINUED | OUTPATIENT
Start: 2017-01-18 | End: 2017-01-19 | Stop reason: HOSPADM

## 2017-01-18 RX ORDER — SODIUM CHLORIDE 9 MG/ML
INJECTION, SOLUTION INTRAVENOUS CONTINUOUS
Status: DISCONTINUED | OUTPATIENT
Start: 2017-01-18 | End: 2017-01-19

## 2017-01-18 RX ORDER — DILTIAZEM HYDROCHLORIDE 5 MG/ML
20 INJECTION INTRAVENOUS ONCE
Status: COMPLETED | OUTPATIENT
Start: 2017-01-18 | End: 2017-01-18

## 2017-01-18 RX ORDER — POTASSIUM CHLORIDE 1500 MG/1
20-40 TABLET, EXTENDED RELEASE ORAL
Status: DISCONTINUED | OUTPATIENT
Start: 2017-01-18 | End: 2017-01-19 | Stop reason: HOSPADM

## 2017-01-18 RX ORDER — MAGNESIUM SULFATE HEPTAHYDRATE 40 MG/ML
4 INJECTION, SOLUTION INTRAVENOUS EVERY 4 HOURS PRN
Status: DISCONTINUED | OUTPATIENT
Start: 2017-01-18 | End: 2017-01-19 | Stop reason: HOSPADM

## 2017-01-18 RX ORDER — HEPARIN SODIUM 5000 [USP'U]/.5ML
5000 INJECTION, SOLUTION INTRAVENOUS; SUBCUTANEOUS EVERY 12 HOURS
Status: DISCONTINUED | OUTPATIENT
Start: 2017-01-18 | End: 2017-01-19 | Stop reason: HOSPADM

## 2017-01-18 RX ORDER — POTASSIUM CHLORIDE 1.5 G/1.58G
20-40 POWDER, FOR SOLUTION ORAL
Status: DISCONTINUED | OUTPATIENT
Start: 2017-01-18 | End: 2017-01-19 | Stop reason: HOSPADM

## 2017-01-18 RX ORDER — DILTIAZEM HYDROCHLORIDE 5 MG/ML
INJECTION INTRAVENOUS
Status: COMPLETED
Start: 2017-01-18 | End: 2017-01-18

## 2017-01-18 RX ORDER — METOPROLOL TARTRATE 25 MG/1
25 TABLET, FILM COATED ORAL ONCE
Status: COMPLETED | OUTPATIENT
Start: 2017-01-18 | End: 2017-01-18

## 2017-01-18 RX ORDER — METOPROLOL TARTRATE 25 MG/1
25 TABLET, FILM COATED ORAL 2 TIMES DAILY
Status: DISCONTINUED | OUTPATIENT
Start: 2017-01-19 | End: 2017-01-19 | Stop reason: HOSPADM

## 2017-01-18 RX ORDER — NALOXONE HYDROCHLORIDE 0.4 MG/ML
.1-.4 INJECTION, SOLUTION INTRAMUSCULAR; INTRAVENOUS; SUBCUTANEOUS
Status: DISCONTINUED | OUTPATIENT
Start: 2017-01-18 | End: 2017-01-19 | Stop reason: HOSPADM

## 2017-01-18 RX ORDER — POLYETHYLENE GLYCOL 3350 17 G/17G
17 POWDER, FOR SOLUTION ORAL DAILY PRN
Status: DISCONTINUED | OUTPATIENT
Start: 2017-01-18 | End: 2017-01-19 | Stop reason: HOSPADM

## 2017-01-18 RX ORDER — ONDANSETRON 4 MG/1
4 TABLET, ORALLY DISINTEGRATING ORAL EVERY 6 HOURS PRN
Status: DISCONTINUED | OUTPATIENT
Start: 2017-01-18 | End: 2017-01-19 | Stop reason: HOSPADM

## 2017-01-18 RX ORDER — ONDANSETRON 2 MG/ML
4 INJECTION INTRAMUSCULAR; INTRAVENOUS EVERY 6 HOURS PRN
Status: DISCONTINUED | OUTPATIENT
Start: 2017-01-18 | End: 2017-01-19 | Stop reason: HOSPADM

## 2017-01-18 RX ORDER — NITROGLYCERIN 0.4 MG/1
0.4 TABLET SUBLINGUAL EVERY 5 MIN PRN
Status: DISCONTINUED | OUTPATIENT
Start: 2017-01-18 | End: 2017-01-19 | Stop reason: HOSPADM

## 2017-01-18 RX ORDER — OXYCODONE HYDROCHLORIDE 5 MG/1
5 TABLET ORAL EVERY 6 HOURS PRN
Status: DISCONTINUED | OUTPATIENT
Start: 2017-01-18 | End: 2017-01-19 | Stop reason: HOSPADM

## 2017-01-18 RX ORDER — ACETAMINOPHEN 325 MG/1
325 TABLET ORAL EVERY 4 HOURS PRN
Status: DISCONTINUED | OUTPATIENT
Start: 2017-01-18 | End: 2017-01-19 | Stop reason: HOSPADM

## 2017-01-18 RX ORDER — POTASSIUM CHLORIDE 7.45 MG/ML
10 INJECTION INTRAVENOUS
Status: DISCONTINUED | OUTPATIENT
Start: 2017-01-18 | End: 2017-01-19 | Stop reason: HOSPADM

## 2017-01-18 RX ADMIN — HEPARIN SODIUM 5000 UNITS: 10000 INJECTION, SOLUTION INTRAVENOUS; SUBCUTANEOUS at 20:15

## 2017-01-18 RX ADMIN — DILTIAZEM HYDROCHLORIDE 5 MG/HR: 5 INJECTION INTRAVENOUS at 18:17

## 2017-01-18 RX ADMIN — METOPROLOL TARTRATE 25 MG: 25 TABLET, FILM COATED ORAL at 20:13

## 2017-01-18 RX ADMIN — DILTIAZEM HYDROCHLORIDE 20 MG: 5 INJECTION, SOLUTION INTRAVENOUS at 17:51

## 2017-01-18 RX ADMIN — DILTIAZEM HYDROCHLORIDE 20 MG: 5 INJECTION INTRAVENOUS at 17:51

## 2017-01-18 ASSESSMENT — ENCOUNTER SYMPTOMS
DIARRHEA: 0
APPETITE CHANGE: 0
CHILLS: 0
NAUSEA: 0
WHEEZING: 0
DIZZINESS: 1
COUGH: 0
SHORTNESS OF BREATH: 1
CHEST TIGHTNESS: 1
LIGHT-HEADEDNESS: 0
PALPITATIONS: 1
ACTIVITY CHANGE: 0
VOMITING: 0
WEAKNESS: 0
FEVER: 0
ABDOMINAL PAIN: 0

## 2017-01-18 ASSESSMENT — PAIN DESCRIPTION - DESCRIPTORS: DESCRIPTORS: ACHING

## 2017-01-18 NOTE — IP AVS SNAPSHOT
[facility]    [address] [place] [id number]    Phone:  [phone]    Fax:  [phone]                                       After Visit Summary   1/18/2017    Hermelinda Murray    MRN: [id number]           After Visit Summary Signature Page     I have received my discharge instructions, and my questions have been answered. I have discussed any challenges I see with this plan with the nurse or doctor.    ..........................................................................................................................................  Patient/Patient Representative Signature      ..........................................................................................................................................  Patient Representative Print Name and Relationship to Patient    ..................................................               ................................................  Date                                            Time    ..........................................................................................................................................  Reviewed by Signature/Title    ...................................................              ..............................................  Date                                                            Time

## 2017-01-18 NOTE — ED NOTES
Bed: ED03  Expected date: 1/18/17  Expected time:   Means of arrival: Ambulance  Comments:  [facility]

## 2017-01-18 NOTE — IP AVS SNAPSHOT
MRN:[id number]                      After Visit Summary   1/18/2017    Hermelinda Murray    MRN: [id number]           Thank you!     Thank you for choosing [facility] for your care. Our goal is always to provide you with excellent care. Hearing back from our patients is one way we can continue to improve our services. Please take a few minutes to complete the written survey that you may receive in the mail after you visit with us. Thank you!        Patient Information     Date Of Birth          1945        About your hospital stay     You were admitted on:  January 18, 2017 You last received care in the:  14 Intensive Care Unit    You were discharged on:  January 19, 2017        Reason for your hospital stay       Hermelinda Murray is a 71 year old woman who presented for evaluation of chest discomfort. The patient has a prior history of atrial flutter and possible atrial fibrillation treated i n the past she had been on warfarin and sotalol which she discontinued because of concerns of medication side effects. Evaluation showed atrial fibrillation, which converted after a short period of treatment with IV diltiazem with transition to metoprolol. Troponin showed transient elevation, felt related to demand. She is discharge with metoprolol and rivarobaxan for anticoagulation with plans for outpatient stress testing.                  Who to Call     For medical emergencies, please call 911.  For non-urgent questions about your medical care, please call your primary care provider or clinic, [phone]          Attending Provider     Provider    Carlie Jimenez, Damir Dorantes MD McKibben, Andrew W, MD       Primary Care Provider Office Phone # Fax #    R Hammad Tian [phone]       [facility] [place] [address]        After Care Instructions     Activity       Your activity upon discharge: activity as tolerated            Diet        Follow this diet upon discharge: Orders Placed This Encounter  Advance Diet as Tolerated: Cardiac                  Follow-up Appointments     Follow-up and recommended labs and tests        Follow up with primary care provider, BI Tian, within 7-10 days for hospital follow- up.    Cardiac stress test to be arranged                  Your next 10 appointments already scheduled     Jan 24, 2017  1:45 PM   (Arrive by 1:30 PM)   Office Visit with BI Tian MD   [facility] ([facility])    [address] [place] [id number]   [phone]           Bring a current list of meds and any records pertaining to this visit.  For Physicals, please bring immunization records and any forms needing to be filled out.    Please arrive 15 minutes early to complete paperwork and register.            Jan 26, 2017  7:00 AM   Radiology with [facility]   [facility] ([facility] )    [address] [id number] [phone]              Future tests that were ordered for you     NM Lexiscan stress test       The type of stress to be performed (pharmacologic or physical) will be at the discretion of the supervising physician as per department protocol.                  Further instructions from your care team       No caffeine or decaffeinated products. No chocolate. No Smoking, 12 hours before procedure. NPO after midnight on January 25th.  And no lotion on chest or abdomen.     Pending Results     Date and Time Order Name Status Description    1/18/2017 2110 Active MRSA Surveillance Culture Preliminary     1/18/2017 1938 Echocardiogram In process             Statement of Approval     Ordered          01/19/17 1252  I have reviewed and agree with all the recommendations and orders detailed in this document.   EFFECTIVE NOW     Approved and electronically signed by:  Ethan Reid MD             Admission Information        Provider Department Dept Phone     "2017 Ethan Bustos MD [facility] [phone]      Your Vitals Were     Blood Pressure Pulse Temperature    133/80 mmHg 144 97.1  F (36.2  C) (Tympanic)    Respirations Height Weight    22 1.651 m (5' 5\") 115.5 kg (254 lb 10.1 oz)    BMI (Body Mass Index) Pulse Oximetry       42.37 kg/m2 93%       MyChart Information     [employer] lets you send messages to your doctor, view your test results, renew your prescriptions, schedule appointments and more. To sign up, go to [email or web address] [employer] . Click on \"Log in\" on the left side of the screen, which will take you to the Welcome page. Then click on \"Sign up Now\" on the right side of the page.     You will be asked to enter the access code listed below, as well as some personal information. Please follow the directions to create your username and password.     Your access code is: [email or web address]  Expires: 2017  1:19 PM     Your access code will  in 90 days. If you need help or a new code, please call your [facility] clinic or [phone].        Care EveryWhere ID     This is your Care EveryWhere ID. This could be used by other organizations to access your [facility] medical records  [id number]           Review of your medicines      START taking        Dose / Directions    acetaminophen 325 MG tablet   Commonly known as:  TYLENOL   Used for:  Degeneration of intervertebral disc of lumbosacral region        Dose:  325 mg   Take 1 tablet (325 mg) by mouth every 4 hours as needed for mild pain   Quantity:  100 tablet   Refills:  0       fluticasone 50 MCG/ACT spray   Commonly known as:  FLONASE   Used for:  Chronic sinusitis, unspecified location        Dose:  1-2 spray   Spray 1-2 sprays into both nostrils daily   Quantity:  1 Bottle   Refills:  11       metoprolol 25 MG tablet   Commonly known as:  LOPRESSOR   Used for:  Paroxysmal atrial fibrillation (H)        Dose:  25 mg   Take 1 tablet (25 mg) by mouth 2 times daily   Quantity:  60 tablet   Refills:  3 "       nitroglycerin 0.4 MG sublingual tablet   Commonly known as:  NITROSTAT   Used for:  Coronary artery disease involving native coronary artery of native heart without angina pectoris        1 tablet under the tongue every 5 minutes for chest discomfort   Quantity:  25 tablet   Refills:  3       rivaroxaban ANTICOAGULANT 20 MG Tabs tablet   Commonly known as:  XARELTO   Used for:  Paroxysmal atrial fibrillation (H)        Dose:  20 mg   Take 1 tablet (20 mg) by mouth daily (with dinner)   Quantity:  30 tablet   Refills:  3       sodium chloride 0.65 % nasal spray   Commonly known as:  OCEAN   Used for:  Chronic sinusitis, unspecified location        Dose:  1 spray   Spray 1 spray into both nostrils daily as needed for congestion   Quantity:  1 Bottle   Refills:  3         CONTINUE these medicines which may have CHANGED, or have new prescriptions. If we are uncertain of the size of tablets/capsules you have at home, strength may be listed as something that might have changed.        Dose / Directions    aspirin 81 MG EC tablet   This may have changed:    - medication strength  - how much to take   Used for:  Coronary artery disease involving native coronary artery of native heart without angina pectoris        Dose:  81 mg   Take 1 tablet (81 mg) by mouth daily   Quantity:  30 tablet   Refills:  3         CONTINUE these medicines which have NOT CHANGED        Dose / Directions    Acidophilus/Goat Milk Caps        Refills:  0       betamethasone dipropionate 0.05 % ointment   Commonly known as:  DIPROSONE   Used for:  Psoriasis        Apply to elbow and cheek as needed for psoriasis flare ups   Quantity:  45 g   Refills:  0       cetirizine 10 MG tablet   Commonly known as:  zyrTEC   Used for:  Dermatitis        Dose:  10 mg   Take 1 tablet (10 mg) by mouth daily   Quantity:  10 tablet   Refills:  0       MULTIVITAL PO        Refills:  0       Vitamin B-12 5000 MCG Subl        Twice a week per pt.   Refills:  0        VITAMIN C PO        Dose:  1000 mg   Take 1,000 mg by mouth daily   Refills:  0            Where to get your medicines      These medications were sent to [id number] - [place], [place] - [address] [place] [facility] [place] [address], [place] [address]     Phone:  [phone]    - aspirin 81 MG EC tablet  - fluticasone 50 MCG/ACT spray  - metoprolol 25 MG tablet  - nitroglycerin 0.4 MG sublingual tablet  - rivaroxaban ANTICOAGULANT 20 MG Tabs tablet  - sodium chloride 0.65 % nasal spray      Some of these will need a paper prescription and others can be bought over the counter. Ask your nurse if you have questions.     You don't need a prescription for these medications    - acetaminophen 325 MG tablet             Protect others around you: Learn how to safely use, store and throw away your medicines at www.disposemymeds.org.             Medication List: This is a list of all your medications and when to take them. Check marks below indicate your daily home schedule. Keep this list as a reference.      Medications           Morning Afternoon Evening Bedtime As Needed    acetaminophen 325 MG tablet   Commonly known as:  TYLENOL   Take 1 tablet (325 mg) by mouth every 4 hours as needed for mild pain                                Acidophilus/Goat Milk Caps                                aspirin 81 MG EC tablet   Take 1 tablet (81 mg) by mouth daily   Last time this was given:  81 mg on 1/19/2017  8:47 AM                                betamethasone dipropionate 0.05 % ointment   Commonly known as:  DIPROSONE   Apply to elbow and cheek as needed for psoriasis flare ups                                cetirizine 10 MG tablet   Commonly known as:  zyrTEC   Take 1 tablet (10 mg) by mouth daily                                fluticasone 50 MCG/ACT spray   Commonly known as:  FLONASE   Spray 1-2 sprays into both nostrils daily                                metoprolol 25 MG tablet   Commonly known  as:  LOPRESSOR   Take 1 tablet (25 mg) by mouth 2 times daily   Last time this was given:  25 mg on 1/19/2017  8:47 AM                                MULTIVITAL PO                                nitroglycerin 0.4 MG sublingual tablet   Commonly known as:  NITROSTAT   1 tablet under the tongue every 5 minutes for chest discomfort                                rivaroxaban ANTICOAGULANT 20 MG Tabs tablet   Commonly known as:  XARELTO   Take 1 tablet (20 mg) by mouth daily (with dinner)                                sodium chloride 0.65 % nasal spray   Commonly known as:  OCEAN   Spray 1 spray into both nostrils daily as needed for congestion                                Vitamin B-12 5000 MCG Subl   Twice a week per pt.                                VITAMIN C PO   Take 1,000 mg by mouth daily                                          More Information        What Is Atrial Flutter/Atrial Fibrillation?      The heart has its own electrical system. This system makes the signals that start each heartbeat. The heartbeat begins in 1 of the 2 upper chambers of the heart (atria). A problem can make the atria beat faster than normal. The atria may beat fast but still evenly. This problem is called atrial flutter. If the atria beat very fast and also unevenly, it is called atrial fibrillation (AFib).  Causes of Atrial Flutter and Atrial Fibrillation  Causes of these problems can include:    Previous heart attack    High blood pressure    Thyroid problems  In many cases, the cause is unknown.  When the Atria Beat Too Fast  The atria may beat fast only once in a while. This is called a paroxysmal heart rhythm problem. If they beat fast all the time, it is a chronic problem.  Atrial Flutter  With atrial flutter, electrical signals travel around and around inside the atria. These circling signals make the atria beat too fast:    Atrial flutter can cause symptoms similar to AFib. It can also lead to the even faster, uneven rhythms  of AFib.  Atrial Fibrillation (AFib)  With AFib, cells in the atria send extra electrical signals. These extra signals make the atria beat very fast. They also beat unevenly:    The atria beat so fast and unevenly that they may quiver instead of shay. If the atria don t contract, they don t move enough blood into the 2 lower chambers of the heart (ventricles). This can cause you to feel dizzy or weak.    Blood that doesn t keep moving can pool and form clots in the atria. These clots can move into other parts of the body and cause serious problems such as a stroke.   Symptoms of Atrial Flutter and AFib  These symptoms include the following:    Palpitations (a fluttering, fast heartbeat)    Weakness or tiredness    Shortness of breath    Chest pain or tightness    Dizziness or lightheadedness    Fainting spells     [id number] [employer]. [address] [place] [address]. All rights reserved. This information is not intended as a substitute for professional medical care. Always follow your healthcare professional's instructions.                Patient Education    Diltiazem Hydrochloride Oral capsule, extended-release    Diltiazem Hydrochloride Oral tablet    Diltiazem Hydrochloride Oral tablet, extended-release    Diltiazem Hydrochloride Solution for injection    Diltiazem Maleate Oral tablet, extended-release  Diltiazem Hydrochloride Solution for injection  What is this medicine?  DILTIAZEM (dil NIEVES a zem) is a calcium-channel blocker. It affects the amount of calcium found in your heart and muscle cells. This relaxes your blood vessels, which can reduce the amount of work the heart has to do.  This medicine may be used for other purposes; ask your health care provider or pharmacist if you have questions.  What should I tell my health care provider before I take this medicine?  They need to know if you have any of these conditions:    heart disease, low blood pressure, irregular  heartbeat    liver disease    previous heart attack    an unusual or allergic reaction to diltiazem, other medicines, foods, dyes, or preservatives    pregnant or trying to get pregnant    breast-feeding  How should I use this medicine?  This medicine is for injection or infusion into a vein. It is given by a health care professional in a hospital or clinic setting.  Talk to your pediatrician regarding the use of this medicine in children. Special care may be needed.  Overdosage: If you think you have taken too much of this medicine contact a poison control center or emergency room at once.  NOTE: This medicine is only for you. Do not share this medicine with others.  What if I miss a dose?  This does not apply.  What may interact with this medicine?  Do not take this medicine with any of the following:    cisapride    hawthorn    pimozide    ranolazine    red yeast rice  This medicine may also interact with the following medications:    carbamazepine    cimetidine    cyclosporine    digoxin    local anesthetics or general anesthetics    medicines for high blood pressure or heart problems  This list may not describe all possible interactions. Give your health care provider a list of all the medicines, herbs, non-prescription drugs, or dietary supplements you use. Also tell them if you smoke, drink alcohol, or use illegal drugs. Some items may interact with your medicine.  What should I watch for while using this medicine?  Your condition will be monitored carefully while you are receiving this medicine.  What side effects may I notice from receiving this medicine?  Side effects that you should report to your doctor or health care professional as soon as possible:    allergic reactions like skin rash, itching or hives, swelling of the face, lips, or tongue    confusion, mental depression    feeling faint or lightheaded, falls    pinpoint red spots on the skin    redness, blistering, peeling or loosening of the skin,  including inside the mouth    slow, irregular heartbeat    swelling of the ankles, feet    unusual bleeding or bruising  Side effects that usually do not require medical attention (report to your doctor or health care professional if they continue or are bothersome):    change in sex drive or performance    constipation or diarrhea    flushing of the face    headache    nausea, vomiting    tired or weak    trouble sleeping  This list may not describe all possible side effects. Call your doctor for medical advice about side effects. You may report side effects to FDA at 8-130-VTR-0294.  Where should I keep my medicine?  This drug is given in a hospital or clinic and will not be stored at home.  NOTE:This sheet is a summary. It may not cover all possible information. If you have questions about this medicine, talk to your doctor, pharmacist, or health care provider. Copyright  2016 [employer]                Patient Education    Metoprolol Succinate Oral tablet, extended-release    Metoprolol Tartrate Oral tablet    Metoprolol Tartrate Solution for injection  Metoprolol Tartrate Oral tablet  What is this medicine?  METOPROLOL (me TOE proe lole) is a beta-blocker. Beta-blockers reduce the workload on the heart and help it to beat more regularly. This medicine is used to treat high blood pressure and to prevent chest pain. It is also used to after a heart attack and to prevent an additional heart attack from occurring.  This medicine may be used for other purposes; ask your health care provider or pharmacist if you have questions.  What should I tell my health care provider before I take this medicine?  They need to know if you have any of these conditions:    diabetes    heart or vessel disease like slow heart rate, worsening heart failure, heart block, sick sinus syndrome or Raynaud's disease    kidney disease    liver disease    lung or breathing disease, like asthma or emphysema    pheochromocytoma    thyroid  disease    an unusual or allergic reaction to metoprolol, other beta-blockers, medicines, foods, dyes, or preservatives    pregnant or trying to get pregnant    breast-feeding  How should I use this medicine?  Take this medicine by mouth with a drink of water. Follow the directions on the prescription label. Take this medicine immediately after meals. Take your doses at regular intervals. Do not take more medicine than directed. Do not stop taking this medicine suddenly. This could lead to serious heart-related effects.  Talk to your pediatrician regarding the use of this medicine in children. Special care may be needed.  Overdosage: If you think you have taken too much of this medicine contact a poison control center or emergency room at once.  NOTE: This medicine is only for you. Do not share this medicine with others.  What if I miss a dose?  If you miss a dose, take it as soon as you can. If it is almost time for your next dose, take only that dose. Do not take double or extra doses.  What may interact with this medicine?  This medicine may interact with the following medications:    certain medicines for blood pressure, heart disease, irregular heart beat    certain medicines for depression like monoamine oxidase (MAO) inhibitors, fluoxetine, or paroxetine    clonidine    dobutamine    epinephrine    isoproterenol    reserpine  This list may not describe all possible interactions. Give your health care provider a list of all the medicines, herbs, non-prescription drugs, or dietary supplements you use. Also tell them if you smoke, drink alcohol, or use illegal drugs. Some items may interact with your medicine.  What should I watch for while using this medicine?  Visit your doctor or health care professional for regular check ups. Contact your doctor right away if your symptoms worsen. Check your blood pressure and pulse rate regularly. Ask your health care professional what your blood pressure and pulse rate  should be, and when you should contact them.  You may get drowsy or dizzy. Do not drive, use machinery, or do anything that needs mental alertness until you know how this medicine affects you. Do not sit or stand up quickly, especially if you are an older patient. This reduces the risk of dizzy or fainting spells. Contact your doctor if these symptoms continue. Alcohol may interfere with the effect of this medicine. Avoid alcoholic drinks.  What side effects may I notice from receiving this medicine?  Side effects that you should report to your doctor or health care professional as soon as possible:    allergic reactions like skin rash, itching or hives    cold or numb hands or feet    depression    difficulty breathing    faint    fever with sore throat    irregular heartbeat, chest pain    rapid weight gain    swollen legs or ankles  Side effects that usually do not require medical attention (report to your doctor or health care professional if they continue or are bothersome):    anxiety or nervousness    change in sex drive or performance    dry skin    headache    nightmares or trouble sleeping    short term memory loss    stomach upset or diarrhea    unusually tired  This list may not describe all possible side effects. Call your doctor for medical advice about side effects. You may report side effects to FDA at 9-989-FDA-9912.  Where should I keep my medicine?  Keep out of the reach of children.  Store at room temperature between 15 and 30 degrees C (59 and 86 degrees F). Throw away any unused medicine after the expiration date.  NOTE:This sheet is a summary. It may not cover all possible information. If you have questions about this medicine, talk to your doctor, pharmacist, or health care provider. Copyright  2016 [employer]                Patient Education    Heparin Sodium Solution for injection    Heparin Sodium , Dextrose Solution for injection    Heparin Sodium , Sodium Chloride Solution for  injection  Heparin Sodium Solution for injection  What is this medicine?  HEPARIN (HEP a rin) is an anticoagulant. It is used to treat or prevent clots in the veins, arteries, lungs, or heart. It stops clots from forming or getting bigger. This medicine prevents clotting during open-heart surgery, dialysis, or in patients who are confined to bed.  This medicine may be used for other purposes; ask your health care provider or pharmacist if you have questions.  What should I tell my health care provider before I take this medicine?  They need to know if you have any of these conditions:    bleeding disorders, such as hemophilia or low blood platelets    bowel disease or diverticulitis    endocarditis    high blood pressure    liver disease    recent surgery or delivery of a baby    stomach ulcers    an unusual or allergic reaction to heparin, benzyl alcohol, sulfites, other medicines, foods, dyes, or preservatives    pregnant or trying to get pregnant    breast-feeding  How should I use this medicine?  This medicine is given by injection or infusion into a vein. It can also be given by injection of small amounts under the skin. It is usually given by a health care professional in a hospital or clinic setting.  If you get this medicine at home, you will be taught how to prepare and give this medicine. Use exactly as directed. Take your medicine at regular intervals. Do not take it more often than directed. Do not stop taking except on your doctor's advice. Stopping this medicine may increase your risk of a blot clot. Be sure to refill your prescription before you run out of medicine.  It is important that you put your used needles and syringes in a special sharps container. Do not put them in a trash can. If you do not have a sharps container, call your pharmacist or healthcare provider to get one.  Talk to your pediatrician regarding the use of this medicine in children. While this medicine may be prescribed for  children for selected conditions, precautions do apply.  Overdosage: If you think you have taken too much of this medicine contact a poison control center or emergency room at once.  NOTE: This medicine is only for you. Do not share this medicine with others.  What if I miss a dose?  If you miss a dose, take it as soon as you can. If it is almost time for your next dose, take only that dose. Do not take double or extra doses.  What may interact with this medicine?  Do not take this medicine with any of the following medications:    aspirin and aspirin-like drugs    mifepristone    medicines that treat or prevent blood clots like warfarin, enoxaparin, and dalteparin    palifermin    protamine  This medicine may also interact with the following medications:    dextran    digoxin    hydroxychloroquine    medicines for treating colds or allergies    nicotine    NSAIDs, medicines for pain and inflammation, like ibuprofen or naproxen    phenylbutazone    tetracycline antibiotics  This list may not describe all possible interactions. Give your health care provider a list of all the medicines, herbs, non-prescription drugs, or dietary supplements you use. Also tell them if you smoke, drink alcohol, or use illegal drugs. Some items may interact with your medicine.  What should I watch for while using this medicine?  While you are taking this medicine, carry an identification card with your name, the name and dose of medicine(s) being used, and the name and phone number of your doctor or health care professional or person to contact in an emergency.  Notify your doctor or health care professional and seek emergency treatment if you develop breathing problems; changes in vision; chest pain; severe, sudden headache; pain, swelling, warmth in the leg; trouble speaking; sudden numbness or weakness of the face, arm, or leg. These can be signs that your condition has gotten worse.  Notify your doctor or health care professional at  once if you have cold, blue hands or feet.  If you are going to have surgery or dental work, tell your doctor or health care professional that you have received this medicine. Be careful brushing and flossing your teeth or using a toothpick while receiving this medicine because you may bleed more easily.  Avoid sports and activities that might cause injury while you are using this medicine. Severe falls or injuries can cause unseen bleeding. Be careful when using sharp tools or knives. Consider using an electric razor.  What side effects may I notice from receiving this medicine?  Side effects that you should report to your doctor or health care professional as soon as possible:    allergic reactions like skin rash, itching or hives, swelling of the face, lips, or tongue    back pain    burning or itching on the bottoms of the feet    cold, blue, or painful hands and feet    feeling faint or lightheaded, falls    fever, chills    nausea, vomiting    signs and symptoms of bleeding such as bloody or black, tarry stools; red or dark-brown urine; spitting up blood or brown material that looks like coffee grounds; red spots on the skin; unusual bruising or bleeding from the eye, gums, or nose    stomach pain    unusually low blood pressure  Side effects that usually do not require medical attention (report to your doctor or health care professional if they continue or are bothersome):    pain, redness, or irritation at site where injected  This list may not describe all possible side effects. Call your doctor for medical advice about side effects. You may report side effects to FDA at 2-279-FDA-3188.  Where should I keep my medicine?  Keep out of the reach of children.  Store unopened vials at room temperature between 15 and 30 degrees C (59 and 86 degrees F). Do not freeze. Do not use if solution is discolored or particulate matter is present. Throw away any unused medicine after the expiration date.  NOTE:This sheet is  a summary. It may not cover all possible information. If you have questions about this medicine, talk to your doctor, pharmacist, or health care provider. Copyright  2016 Gold Standard                Rivaroxaban Oral tablet  What is this medicine?  RIVAROXABAN (ri va FRANCI a ban) is an anticoagulant (blood thinner). It is used to treat blood clots in the lungs or in the veins. It is also used after knee or hip surgeries to prevent blood clots. It is also used to lower the chance of stroke in people with a medical condition called atrial fibrillation.  This medicine may be used for other purposes; ask your health care provider or pharmacist if you have questions.  What should I tell my health care provider before I take this medicine?  They need to know if you have any of these conditions:    bleeding disorders    bleeding in the brain    blood in your stools (black or tarry stools) or if you have blood in your vomit    history of stomach bleeding    kidney disease    liver disease    low blood counts, like low white cell, platelet, or red cell counts    recent or planned spinal or epidural procedure    take medicines that treat or prevent blood clots    an unusual or allergic reaction to rivaroxaban, other medicines, foods, dyes, or preservatives    pregnant or trying to get pregnant    breast-feeding  How should I use this medicine?  Take this medicine by mouth with a glass of water. Follow the directions on the prescription label. Take your medicine at regular intervals. Do not take it more often than directed. Do not stop taking except on your doctor's advice. Stopping this medicine may increase your risk of a blot clot. Be sure to refill your prescription before you run out of medicine.  If you are taking this medicine after hip or knee replacement surgery, take it with or without food. If you are taking this medicine for atrial fibrillation, take it with your evening meal. If you are taking this medicine to treat  blood clots, take it with food at the same time each day. If you are unable to swallow your tablet, you may crush the tablet and mix it in applesauce. Then, immediately eat the applesauce. You should eat more food right after you eat the applesauce containing the crushed tablet.  Talk to your pediatrician regarding the use of this medicine in children. Special care may be needed.  Overdosage: If you think you have taken too much of this medicine contact a poison control center or emergency room at once.  NOTE: This medicine is only for you. Do not share this medicine with others.  What if I miss a dose?  If you take your medicine once a day and miss a dose, take the missed dose as soon as you remember. If you take your medicine twice a day and miss a dose, take the missed dose immediately. In this instance, 2 tablets may be taken at the same time. The next day you should take 1 tablet twice a day as directed.  What may interact with this medicine?    aspirin and aspirin-like medicines    certain antibiotics like erythromycin, azithromycin, and clarithromycin    certain medicines for fungal infections like ketoconazole and itraconazole    certain medicines for irregular heart beat like amiodarone, quinidine, dronedarone    certain medicines for seizures like carbamazepine, phenytoin    certain medicines that treat or prevent blood clots like warfarin, enoxaparin, and dalteparin    conivaptan    diltiazem    felodipine    indinavir    lopinavir; ritonavir    NSAIDS, medicines for pain and inflammation, like ibuprofen or naproxen    ranolazine    rifampin    ritonavir    Marion's wort    verapamil  This list may not describe all possible interactions. Give your health care provider a list of all the medicines, herbs, non-prescription drugs, or dietary supplements you use. Also tell them if you smoke, drink alcohol, or use illegal drugs. Some items may interact with your medicine.  What should I watch for while using  this medicine?  Visit your doctor or health care professional for regular checks on your progress. Your condition will be monitored carefully while you are receiving this medicine.  If you are going to have surgery, tell your doctor or health care professional that you are taking this medicine.  Avoid sports and activities that might cause injury while you are using this medicine. Severe falls or injuries can cause unseen bleeding. Be careful when using sharp tools or knives. Consider using an electric razor. Take special care brushing or flossing your teeth. Report any injuries, bruising, or red spots on the skin to your doctor or health care professional.  What side effects may I notice from receiving this medicine?  Side effects that you should report to your doctor or health care professional as soon as possible:    allergic reactions like skin rash, itching or hives, swelling of the face, lips, or tongue    back pain    confusion, trouble speaking or understanding    redness, blistering, peeling or loosening of the skin, including inside the mouth    signs and symptoms of bleeding such as bloody or black, tarry stools; red or dark-brown urine; spitting up blood or brown material that looks like coffee grounds; red spots on the skin; unusual bruising or bleeding from the eye, gums, or nose    signs and symptoms of a blood clot such as breathing problems; changes in vision; chest pain; severe, sudden headache; pain, swelling, warmth in the leg; trouble speaking; sudden numbness or weakness of the face, arm, or leg    trouble walking, dizziness, loss of balance or coordination  Side effects that usually do not require medical attention (Report these to your doctor or health care professional if they continue or are bothersome.):    dizziness    muscle pain  This list may not describe all possible side effects. Call your doctor for medical advice about side effects. You may report side effects to FDA at  1-800-FDA-1088.  Where should I keep my medicine?  Keep out of the reach of children.  Store at room temperature between 15 and 30 degrees C (59 and 86 degrees F). Throw away any unused medicine after the expiration date.  NOTE: This sheet is a summary. It may not cover all possible information. If you have questions about this medicine, talk to your doctor, pharmacist, or health care provider.  NOTE:This sheet is a summary. It may not cover all possible information. If you have questions about this medicine, talk to your doctor, pharmacist, or health care provider. Copyright  2014 Gold Standard                Patient Education    Nitroglycerin Oral capsule, extended-release    Nitroglycerin Oral tablet, extended-release    Nitroglycerin Rectal ointment    Nitroglycerin Solution for injection    Nitroglycerin Sublingual tablet    Nitroglycerin Sublingual/Translingual spray    Nitroglycerin Topical ointment    Nitroglycerin Transdermal patch - 24 hour    Nitroglycerin, Dextrose Solution for injection  Nitroglycerin Sublingual tablet  What is this medicine?  NITROGLYCERIN (brian troe GLI ser in) is a type of vasodilator. It relaxes blood vessels, increasing the blood and oxygen supply to your heart. This medicine is used to relieve chest pain caused by angina. It is also used to prevent chest pain before activities like climbing stairs, going outdoors in cold weather, or sexual activity.  This medicine may be used for other purposes; ask your health care provider or pharmacist if you have questions.  What should I tell my health care provider before I take this medicine?  They need to know if you have any of these conditions:    anemia    head injury, recent stroke, or bleeding in the brain    liver disease    previous heart attack    an unusual or allergic reaction to nitroglycerin, other medicines, foods, dyes, or preservatives    pregnant or trying to get pregnant    breast-feeding  How should I use this  medicine?  Take this medicine by mouth as needed. At the first sign of an angina attack (chest pain or tightness) place one tablet under your tongue. You can also take this medicine 5 to 10 minutes before an event likely to produce chest pain. Follow the directions on the prescription label. Let the tablet dissolve under the tongue. Do not swallow whole. Replace the dose if you accidentally swallow it. It will help if your mouth is not dry. Saliva around the tablet will help it to dissolve more quickly. Do not eat or drink, smoke or chew tobacco while a tablet is dissolving. If you are not better within 5 minutes after taking ONE dose of nitroglycerin, call 9-1-1 immediately to seek emergency medical care. Do not take more than 3 nitroglycerin tablets over 15 minutes.  If you take this medicine often to relieve symptoms of angina, your doctor or health care professional may provide you with different instructions to manage your symptoms. If symptoms do not go away after following these instructions, it is important to call 9-1-1 immediately. Do not take more than 3 nitroglycerin tablets over 15 minutes.  Talk to your pediatrician regarding the use of this medicine in children. Special care may be needed.  Overdosage: If you think you have taken too much of this medicine contact a poison control center or emergency room at once.  NOTE: This medicine is only for you. Do not share this medicine with others.  What if I miss a dose?  This does not apply. This medicine is only used as needed.  What may interact with this medicine?  Do not take this medicine with any of the following medications:    certain migraine medicines like ergotamine and dihydroergotamine (DHE)    medicines used to treat erectile dysfunction like sildenafil, tadalafil, and vardenafil    riociguat  This medicine may also interact with the following medications:    alteplase    aspirin    heparin    medicines for high blood pressure    medicines for  mental depression    other medicines used to treat angina    phenothiazines like chlorpromazine, mesoridazine, prochlorperazine, thioridazine  This list may not describe all possible interactions. Give your health care provider a list of all the medicines, herbs, non-prescription drugs, or dietary supplements you use. Also tell them if you smoke, drink alcohol, or use illegal drugs. Some items may interact with your medicine.  What should I watch for while using this medicine?  Tell your doctor or health care professional if you feel your medicine is no longer working.  Keep this medicine with you at all times. Sit or lie down when you take your medicine to prevent falling if you feel dizzy or faint after using it. Try to remain calm. This will help you to feel better faster. If you feel dizzy, take several deep breaths and lie down with your feet propped up, or bend forward with your head resting between your knees.  You may get drowsy or dizzy. Do not drive, use machinery, or do anything that needs mental alertness until you know how this drug affects you. Do not stand or sit up quickly, especially if you are an older patient. This reduces the risk of dizzy or fainting spells. Alcohol can make you more drowsy and dizzy. Avoid alcoholic drinks.  Do not treat yourself for coughs, colds, or pain while you are taking this medicine without asking your doctor or health care professional for advice. Some ingredients may increase your blood pressure.  What side effects may I notice from receiving this medicine?  Side effects that you should report to your doctor or health care professional as soon as possible:    blurred vision    dry mouth    skin rash    sweating    the feeling of extreme pressure in the head    unusually weak or tired  Side effects that usually do not require medical attention (report to your doctor or health care professional if they continue or are bothersome):    flushing of the face or  neck    headache    irregular heartbeat, palpitations    nausea, vomiting  This list may not describe all possible side effects. Call your doctor for medical advice about side effects. You may report side effects to FDA at 2-958-KYV-4047.  Where should I keep my medicine?  Keep out of the reach of children.  Store at room temperature between 20 and 25 degrees C (68 and 77 degrees F). Store in original container. Protect from light and moisture. Keep tightly closed. Throw away any unused medicine after the expiration date.  NOTE:This sheet is a summary. It may not cover all possible information. If you have questions about this medicine, talk to your doctor, pharmacist, or health care provider. Copyright  2016 Gold Standard

## 2017-01-19 ENCOUNTER — APPOINTMENT (OUTPATIENT)
Dept: ULTRASOUND IMAGING | Facility: HOSPITAL | Age: 72
DRG: 310 | End: 2017-01-19
Payer: COMMERCIAL

## 2017-01-19 VITALS
SYSTOLIC BLOOD PRESSURE: 133 MMHG | OXYGEN SATURATION: 93 % | WEIGHT: 254.63 LBS | RESPIRATION RATE: 22 BRPM | TEMPERATURE: 97.1 F | DIASTOLIC BLOOD PRESSURE: 80 MMHG | HEART RATE: 144 BPM | BODY MASS INDEX: 42.42 KG/M2 | HEIGHT: 65 IN

## 2017-01-19 DIAGNOSIS — R07.89 ATYPICAL CHEST PAIN: ICD-10-CM

## 2017-01-19 DIAGNOSIS — I25.10 CORONARY ARTERY DISEASE INVOLVING NATIVE CORONARY ARTERY OF NATIVE HEART WITHOUT ANGINA PECTORIS: ICD-10-CM

## 2017-01-19 PROBLEM — I48.0 PAROXYSMAL ATRIAL FIBRILLATION (H): Status: ACTIVE | Noted: 2017-01-19

## 2017-01-19 LAB
ANION GAP SERPL CALCULATED.3IONS-SCNC: 7 MMOL/L (ref 3–14)
BUN SERPL-MCNC: 10 MG/DL (ref 7–30)
CALCIUM SERPL-MCNC: 8.1 MG/DL (ref 8.5–10.1)
CHLORIDE SERPL-SCNC: 105 MMOL/L (ref 94–109)
CHOLEST SERPL-MCNC: 166 MG/DL
CO2 SERPL-SCNC: 31 MMOL/L (ref 20–32)
CREAT SERPL-MCNC: 0.68 MG/DL (ref 0.52–1.04)
ERYTHROCYTE [DISTWIDTH] IN BLOOD BY AUTOMATED COUNT: 13.2 % (ref 10–15)
GFR SERPL CREATININE-BSD FRML MDRD: 86 ML/MIN/1.7M2
GLUCOSE SERPL-MCNC: 107 MG/DL (ref 70–99)
HCT VFR BLD AUTO: 40 % (ref 35–47)
HDLC SERPL-MCNC: 42 MG/DL
HGB BLD-MCNC: 13.4 G/DL (ref 11.7–15.7)
LDLC SERPL CALC-MCNC: 84 MG/DL
MCH RBC QN AUTO: 29.4 PG (ref 26.5–33)
MCHC RBC AUTO-ENTMCNC: 33.5 G/DL (ref 31.5–36.5)
MCV RBC AUTO: 88 FL (ref 78–100)
NONHDLC SERPL-MCNC: 124 MG/DL
PLATELET # BLD AUTO: 166 10E9/L (ref 150–450)
POTASSIUM SERPL-SCNC: 4.2 MMOL/L (ref 3.4–5.3)
RBC # BLD AUTO: 4.56 10E12/L (ref 3.8–5.2)
SODIUM SERPL-SCNC: 143 MMOL/L (ref 133–144)
TRIGL SERPL-MCNC: 198 MG/DL
TROPONIN I SERPL-MCNC: 0.06 UG/L (ref 0–0.04)
TROPONIN I SERPL-MCNC: 0.08 UG/L (ref 0–0.04)
WBC # BLD AUTO: 7.6 10E9/L (ref 4–11)

## 2017-01-19 PROCEDURE — 93306 TTE W/DOPPLER COMPLETE: CPT | Mod: 26 | Performed by: INTERNAL MEDICINE

## 2017-01-19 PROCEDURE — 93010 ELECTROCARDIOGRAM REPORT: CPT | Performed by: INTERNAL MEDICINE

## 2017-01-19 PROCEDURE — 84484 ASSAY OF TROPONIN QUANT: CPT | Performed by: INTERNAL MEDICINE

## 2017-01-19 PROCEDURE — 93005 ELECTROCARDIOGRAM TRACING: CPT

## 2017-01-19 PROCEDURE — 25000132 ZZH RX MED GY IP 250 OP 250 PS 637: Performed by: INTERNAL MEDICINE

## 2017-01-19 PROCEDURE — 80061 LIPID PANEL: CPT | Performed by: INTERNAL MEDICINE

## 2017-01-19 PROCEDURE — 99217 ZZC OBSERVATION CARE DISCHARGE: CPT | Performed by: INTERNAL MEDICINE

## 2017-01-19 PROCEDURE — 36415 COLL VENOUS BLD VENIPUNCTURE: CPT | Performed by: HOSPITALIST

## 2017-01-19 PROCEDURE — 25000125 ZZHC RX 250: Performed by: HOSPITALIST

## 2017-01-19 PROCEDURE — 25000128 H RX IP 250 OP 636: Performed by: HOSPITALIST

## 2017-01-19 PROCEDURE — 84484 ASSAY OF TROPONIN QUANT: CPT | Performed by: HOSPITALIST

## 2017-01-19 PROCEDURE — 80048 BASIC METABOLIC PNL TOTAL CA: CPT | Performed by: HOSPITALIST

## 2017-01-19 PROCEDURE — 93306 TTE W/DOPPLER COMPLETE: CPT | Mod: TC

## 2017-01-19 PROCEDURE — 25000132 ZZH RX MED GY IP 250 OP 250 PS 637: Performed by: HOSPITALIST

## 2017-01-19 PROCEDURE — 36415 COLL VENOUS BLD VENIPUNCTURE: CPT | Performed by: INTERNAL MEDICINE

## 2017-01-19 PROCEDURE — 85027 COMPLETE CBC AUTOMATED: CPT | Performed by: HOSPITALIST

## 2017-01-19 PROCEDURE — G0378 HOSPITAL OBSERVATION PER HR: HCPCS

## 2017-01-19 RX ORDER — ASPIRIN 81 MG/1
81 TABLET ORAL DAILY
Status: DISCONTINUED | OUTPATIENT
Start: 2017-01-19 | End: 2017-01-19 | Stop reason: HOSPADM

## 2017-01-19 RX ORDER — METOPROLOL TARTRATE 25 MG/1
25 TABLET, FILM COATED ORAL 2 TIMES DAILY
Qty: 60 TABLET | Refills: 3 | Status: SHIPPED | OUTPATIENT
Start: 2017-01-19 | End: 2017-01-24

## 2017-01-19 RX ORDER — FLUTICASONE PROPIONATE 50 MCG
1-2 SPRAY, SUSPENSION (ML) NASAL DAILY
Qty: 1 BOTTLE | Refills: 11 | Status: SHIPPED | OUTPATIENT
Start: 2017-01-19 | End: 2017-01-01

## 2017-01-19 RX ORDER — ACETAMINOPHEN 325 MG/1
325 TABLET ORAL EVERY 4 HOURS PRN
Qty: 100 TABLET | COMMUNITY
Start: 2017-01-19 | End: 2017-01-01

## 2017-01-19 RX ORDER — ECHINACEA PURPUREA EXTRACT 125 MG
1 TABLET ORAL DAILY PRN
Qty: 1 BOTTLE | Refills: 3 | Status: SHIPPED | OUTPATIENT
Start: 2017-01-19 | End: 2017-01-01

## 2017-01-19 RX ORDER — NITROGLYCERIN 0.4 MG/1
TABLET SUBLINGUAL
Qty: 25 TABLET | Refills: 3 | Status: SHIPPED | OUTPATIENT
Start: 2017-01-19 | End: 2017-01-01

## 2017-01-19 RX ADMIN — METOPROLOL TARTRATE 25 MG: 25 TABLET, FILM COATED ORAL at 08:47

## 2017-01-19 RX ADMIN — ASPIRIN 81 MG: 81 TABLET, COATED ORAL at 08:47

## 2017-01-19 RX ADMIN — HEPARIN SODIUM 5000 UNITS: 10000 INJECTION, SOLUTION INTRAVENOUS; SUBCUTANEOUS at 08:47

## 2017-01-19 RX ADMIN — SODIUM CHLORIDE, PRESERVATIVE FREE: 5 INJECTION INTRAVENOUS at 01:16

## 2017-01-19 NOTE — PLAN OF CARE
Problem: Patient Goal: Social Work Focus  Goal: 1. Patient Goal: Social Work Focus  Outcome: Adequate for Discharge Date Met:  01/19/17  Assessment completed via flow sheet.    Hermelinda is awake sitting on the edge of the bed. Her PCP is Dr Hammad Tian, her dentist is Dr Valeriy Sexton and she sees a eye care professional at [facility]. She does not have a POA, she does have a healthcare directive. She uses [facility]. She is not a .     She lives in a duplex in [place] alone. She states she feels safe at home, the duplex is located  in the [place] [facility] and shares that the property is not well maintained. She has services through the [facility] for housekeeping and laundry. She drives and has a reliable vehicle. She normally performs her own self cares and manages her own appointment schedule. Her daughter and sisters live in the local area and are her support system.  She does not work.    She does not have trouble sleeping and no mood concerns. She smokes 2 pkgs of cigarettes a week, does not consume alcohol. Her myrna is very important to her. Her admission to the hospital is causing her stress. She likes to pain, play computer games, enjoys bible study, puzzles and crafty stuff.    No identified needs.

## 2017-01-19 NOTE — DISCHARGE INSTRUCTIONS
No caffeine or decaffeinated products. No chocolate. No Smoking, 12 hours before procedure. NPO after midnight on January 25th.  And no lotion on chest or abdomen.  Be in admitting at 6:45 am.

## 2017-01-19 NOTE — ED NOTES
Pt states she took 325mg aspirin at home at 1630. -200. Pt instructed on some vagal procedures with no relief on Afib RVR, briefly heart rate would go down to 160's.

## 2017-01-19 NOTE — PLAN OF CARE
[facility] Inpatient Admission Note:    Patient admitted to 3124/3124-1 at approximately 1925 via ambulation accompanied by nurse from emergency room . Report received from SIRI Hill in SBAR format at 1913 via telephone. Patient ambulated to bed via self.. Patient is alert and oriented X 3, reports pain; rates at 4 on 0-10 scale.  Patient oriented to room, unit, hourly rounding, and plan of care. Explained admission packet and patient handbook with patient bill of rights brochure. Will continue to monitor and document as needed.     Inpatient Nursing criteria listed below was met:      Health care directives status obtained and documented: Yes      Care Everywhere authorization obtained No      MRSA swab completed for patient 65 years and older: Yes      Patient identifies a surrogate decision maker: Yes If yes, who:Jennifer Fong Contact Information:[phone]      Core Measure diagnosis present:No. If yes, state diagnosis: n/a       If initial lactic acid >2.0, repeat lactic acid drawn within one hour of arrival to unit: NA. If no, state reason: n/a      Vaccination assessment and education completed: Yes   Vaccinations received prior to admission: Pneumovax no  Influenza(seasonal)  NO   Vaccination(s) ordered: patient declines      Clergy visit ordered if patient requests: N/A      Skin issues/needs documented: Yes      Isolation Patient: no Education given, correct sign in place and documentation row added to PCS:  n/a      Fall Prevention Yes: Care plan updated, education given and documented, sticker and magnet in place: Yes      Care Plan initiated: Yes      Education Documented (including assessment): Yes      Patient has discharge needs : No If yes, please explain:n/a

## 2017-01-19 NOTE — H&P
[facility]    History and Physical  Hospitalist       Date of Admission:  1/18/2017  Date of Service (when I saw the patient): 01/18/2017    Assessment and Plan  Hermelinda Murray is a 71 year old female who presents with acute chest pain and found to be in Atrial Fibrillation on RVR    Active Problems:    1. Hx of Atrial Flutter, possible Hx of Atrial Fibrillation presenting with Acute Atrial Fibrillation w/RVR  2. Chest Pain   -Check Mg, TSH   -PO metoprolol 25 mg PO BID   -IV metoprolol prn   -Diltiazem infusion   -Echo in AM to help determine CHADSVASC2 score, assuming nonvalvular afib, she will likely need to be started on oral anticoagulation/NOAC   -check hemoglobin a1c   -recommend outpatient sleep study   -continue aspirin for now   -serial troponin    3. Hx of GILLESPIE Stage 3 per chart review with mildly elevated LFTs   -follow LFTs    4.Chronic Mild Polycythemia Vera, likely 2/2 to chronic hx of Hypoxia    5. Tobacco d/o   -smoking cessation counseling    DVT Prophylaxis: Heparin SQ  Code Status: DNR/DNI    Disposition: Expected discharge in 2-3 days once cardiac workup complete.    [facility]    Primary Care Physician  IB Tian    Chief Complaint  Chest pain     History is obtained from the patient    History of Present Illness  Hermelinda Murray is a very pleasant 71 year old female who presents for evaluation of chest pain. The patient has a prior history of atrial flutter and possible atrial fibrillation per chart review. In the past she had been on warfarin and sotalol. Early this afternoon the patient had sharp chest pain localized to mid sternum. It was 10/10 intensity. It radiated to her neck and arms. It was associated with SOB. She denied headache or dizziness. She denied nausea or vomiting. Upon presentation to ED the patient was noted to be in Afib w/rvr with HR in 140-160's. She was started on diltiazem infusion and admitted to ICU. She denies history of CAREY.     Past Medical  History   I have reviewed this patient's medical history and updated it with pertinent information if needed.   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  Past Surgical History   Procedure Laterality Date     Laparoscopic cholecystectomy       Hysterectomy       partial     Back surgery       x2     Back surgery       back fusion     Knee surgery       knee replacement, right     Lumpectomy breast  2005     left     Neck surgery  2010     left plugged neck artery with stent placement     Orthopedic surgery Right      TKA     Low back fusion  2000     L1,2,3,4     Stent  2008     coronary     Arthroplasty knee Left 10/19/2015     Procedure: ARTHROPLASTY KNEE;  Surgeon: Jaren James MD;  Location: [facility] OR     Colonoscopy  5-       Prior to Admission Medications  Prior to Admission Medications   Prescriptions Last Dose Informant Patient Reported? Taking?   Ascorbic Acid (VITAMIN C PO) 1/18/2017 at 0800  Yes Yes   Sig: Take 1,000 mg by mouth daily   Cyanocobalamin (VITAMIN B-12) 5000 MCG SUBL Past Week at Unknown time  Yes Yes   Sig: Twice a week per pt.   Multiple Vitamins-Minerals (MULTIVITAL PO) 1/18/2017 at 0800  Yes Yes   Probiotic Product (ACIDOPHILUS/GOAT MILK) CAPS Past Week at Unknown time  Yes Yes   aspirin 325 MG EC tablet 1/18/2017 at 1600  Yes Yes   Sig: Take by mouth daily   betamethasone dipropionate (DIPROSONE) 0.05 % ointment Past Month at Unknown time  No  Yes   Sig: Apply to elbow and cheek as needed for psoriasis flare ups   cetirizine (ZYRTEC) 10 MG tablet More than a month at Unknown time  No No   Sig: Take 1 tablet (10 mg) by mouth daily      Facility-Administered Medications: None     Allergies  Allergies   Allergen Reactions     Metal [Staples]      States cheap metal     Seasonal Allergies      Seasonal allergies; nasal congestion and cough.  Per 12/18/15, has history of reacting to metal surgery staples and certain types of jewelry; allergy to cheap metal (scanned record).       Social History  I have reviewed this patient's social history and updated it with pertinent information if needed. Hermelinda Murray  reports that she has been smoking Cigarettes.  She has a 27.5 pack-year smoking history. She has never used smokeless tobacco. She reports that she does not drink alcohol or use illicit drugs.    Family History  I have reviewed this patient's family history and updated it with pertinent information if needed.   Family History   Problem Relation Age of Onset     CANCER Mother 74     unsure where it began; cause of death     HEART DISEASE Sister      disease     HEART DISEASE Brother      disease     Other - See Comments Father 69     myocardial infarction; cause of death       Review of Systems  10 point ROS is negative except as noted in HPI    Physical Exam  Temp: 98.5  F (36.9  C) Temp src: Tympanic BP: 107/69 mmHg Pulse: (!) 144 Heart Rate: 112 Resp: 20 SpO2: 92 % O2 Device: Nasal cannula Oxygen Delivery: 2 LPM  Vital Signs with Ranges  Temp:  [98  F (36.7  C)-98.7  F (37.1  C)] 98.5  F (36.9  C)  Pulse:  [144] 144  Heart Rate:  [] 112  Resp:  [16-24] 20  BP: (102-144)/() 107/69 mmHg  SpO2:  [89 %-96 %] 92 %  250 lbs 0 oz    Constitutional: no acute distress  Eyes: EOMI  HEENT: NCAT. MMM  Respiratory: ctab  Cardiovascular: tachycardic. Irregularly irregular Normal s1. s2  GI: soft, obese nt no rebound or guarding  Genitourinary:  deferred  Skin: warm, dry, no rash on face; venous stasis changes of lower extremities  Musculoskeletal: age appropriate muscle mass  Neurologic:Alert and oriented X3 screening exam with no focal deficits  Psychiatric: appropriate insight and judgment     Data  Data reviewed today:  I personally reviewed today's labs     Recent Labs  Lab 01/18/17  1745   WBC 10.6   HGB 16.0*   MCV 86         POTASSIUM 4.2   CHLORIDE 100   CO2 27   BUN 14   CR 0.66   ANIONGAP 11   FREDERICK 8.8   *   ALBUMIN 3.5   PROTTOTAL 7.8   BILITOTAL 0.6   ALKPHOS 106   ALT 57*   AST 57*   TROPI <0.015The 99th percentile for upper reference range is 0.045 ug/L.  Troponin values in the range of 0.045 - 0.120 ug/L may be associated with risks of adverse clinical events.       No results found for this or any previous visit (from the past 24 hour(s)).      Damir Talley MD

## 2017-01-19 NOTE — PLAN OF CARE
Problem: Goal Outcome Summary  Goal: Goal Outcome Summary  Outcome: Improving  Pt presented to the floor with VSS except for -150's. Pt was alert/oriented and asymptomatic. Received 25mg oral metoprolol and titrated on cardizem drip - at about 2220 pt did convert, see prior note. Pt has remained in SR throughout the rest of the night. Pt became slightly hypotensive around 80s/50s - 500ml bolus given with adequate results. Pt was weaned off O2, at this time pt would dip down to 88%-90% - pt did not want to put O2 back on, Dr. Talley was okay with pt keeping oxygen off. Pt's IV running NS at 100ml/hr without complication. Up to commode with SBA. Lungs clear, bowel sounds active. Voiding adequately. Pt resting in bed, will continue to monitor.      Temp: 98.6  F (37  C) Temp src: Tympanic BP: 93/55 mmHg  Heart Rate: 80 Resp: 14 SpO2: 92 % O2 Device: None (Room air)

## 2017-01-19 NOTE — ED NOTES
"Pt brought in  ambulance. Pt stated she started having chest pain at 1600 today. Stated she had that feeling for 10 minutes off and on the last few days \"but today it wouldn't go away\". Pt had received 1 nitro in the ambulance and pt stated chest pain in the ambulance went from 5/10 to 3/10. Pt state pain in right chest, right neck and right arm. On cardiac monitor  afib.  "

## 2017-01-19 NOTE — DISCHARGE SUMMARY
[facility]    Discharge Summary  Hospitalist    Date of Admission:  1/18/2017  Date of Discharge:  1/19/2017  3:06 PM  Discharging Provider: Ethan Bustos  Date of Service (when I saw the patient): 01/19/2017    Discharge Diagnoses  Paroxysmal atrial fibrillation.  Atypical chest pain.  Known coronary artery disease with prior PCI/stent placement 2008  Left carotid stenosis with intra-arterial stent.  Probable obstructive sleep apnea.  Degenerative arthritis with prior total knee replacements.        History of Present Illness  Hermelinda Murray is an 71 year old woman who presented after developing left anterior chest and neck sharp discomfort beginning abruptly while she was at rest. iintermittently she is noted this over  A period of time of at least a number of months. She presented to emergency department where atrial fibrillation with rapid ventricular response was noted. Prior history of atrial flutter/fibrillation in the context of the replacement and hypoxemic respiratory failure,, responding to both diltiazem and metoprolol.  She subsequently discontinued these medications. She had no other prodromal symptoms. No dyspnea or diaphoresis. Initially she allowed the pain to persist as it has happened in the past.  However, as it was more prolonged than usual.  She presented to emergency department.  She was referred to observation for further evaluation and management.    1.  Paroxysmal atrial fibrillation.  This has occurred in the past.  In the context of acute illness.  She has not had clear recurrence, although history is somewhat difficult to extract and she does note intermittent episodes of sharp or burning  Anterior chest discomfort which she describes as quite superficial, which in the past has begun and ended abruptly.  After evaluation in emergency department with the exception of atrial fibrillation.  Other findings were quite unremarkable.  She was treated with intravenous diltiazem  "with prompt conversion to sinus rhythm.   Conversion was made to metoprolol.  Since that time she has been in sinus rhythm.  She has been able to tolerate walking in the halls without difficulty.  Echocardiogram was obtained.  Formal results are pending.  However, on my informal result review.  Left ventricular function appears to be preserved.  No overt valvular dysfunction.  She has undergone echocardiogram in the past, which also showed preserved left ventricular function without regional wall motion abnormality with some left atrial enlargement, however.  She has had these episodes of atrial fibrillation in the past and I suspect has had episodes relatively asymptomatic since then.  With this event.  It is most reasonable to resume treatment.  She is quite reluctant to consider any medication treatment, indicating \"I always have side effects\" to any medication.  However, after extensive discussion, she is agreeable to continue metoprolol.  Although this may have some central nervous system symptoms.  Given her underlying history of coronary disease.  Toprol wall is likely to be the  Best agent.  Certainly if this is not satisfactory to her.  In terms of symptoms or in terms of rate/rhythm control  Alternatives including diltiazem could be considered.  tthyroid function appears intact.  2.  Anticoagulation.  Review of record indicates that in the past she had been treated with warfarin.  There did not consideration of possible pulmonary embolism.  However, on follow-up CT imaging, based on available records, no evidence of thrombus was noted.  The patient indicates a adverse reaction to warfarin, which she describes as an uncomfortable  Sensation in her years and generally feeling unwell.  She is quite reluctant to consider any retrial of warfarin.  Infectious quite reluctant to consider any medication.  However, after extensive discussion for more than 30 minutes regarding risks and benefits, particularly the " risks of thrombotic events  Such as stroke.  She is willing to try rivarobaxan. This prescribed at the time of discharge.  Review of records does indicate development of headaches and other neurologic symptoms following intra-arterial stent in treatment of carotid stenosis.  It appears the clinical impression was of reperfusion hemorrhage, but that this occurred in the low past approximately 2009 and she has had anticoagulation since then without clear overt adverse reaction.  Overall risk of anticoagulation appears less than benefit.    3.  Atypical chest pain.  I suspect that her chest and neck discomfort which she describes as sharp with the sensation of it being quite  Superficial to her thorax and not associated with activity is either associated with atrial fibrillation orders are to follow chest pain.  She did show a modest elevation in troponin transiently.  I suspect this is related to demand rather than acute ischemia.  Will arrange outpatient nuclear medicine stress testing (degenerative arthritis with bilateral knee replacements and poor exercise tolerance).  She has undergone stress testing in the more remote past.  Apparently, and preoperative evaluation.  At that time.  Evaluation was unremarkable.  Given her known coronary artery disease, although somewhat remotely have continued low-dose aspirin in addition to her anticoagulation.  Metoprolol also has been added, which should be cardioprotective.  Left ventricular function appears preserved.  LDL 84.  Unless she shows further evidence of active coronary disease, statin is unlikely to be of benefit and given her reluctance to consider medication treatment.  I would be inclined to minimize the addition of any medications, not absolutely mandated.  4.  Sleep-disordered breathing.  Although the patient did not show clear evidence of apnea, hypocapnia is calm, or hypoxemia during the course of her ICU monitoring and has no clear hypersomnolence or other   "High-yield symptoms suggesting sleep-disordered breathing, I suspect this may in fact be present. Of note, history is difficult to extract from this patient. During in-hospital management in 2009 when undergoing a knee replacement with a prolonged hospitalization..  Sleep apnea.  In fact, was considered.  Polysomnography  Was recommended, but it appears the patient did not follow up for this. iit would be reasonable to arrange this.  However, given the patient's reticence to consider multiple aspects of medical management and difficulties with multiple physicians.  I would defer any consideration of this at the present time.        Ethan HOLLOWAY Juli    Significant Results and Procedures  echocardiogram    Code Status  DNR / DNI; discussed in some detail with her.  She appears to understand the ramifications of declining resuscitation or life support intervention.        Primary Care Physician  BI Tian    Vital signs:  Temp: 97.1  F (36.2  C) Temp src: Tympanic BP: 133/80 mmHg Pulse: (!) 144 Heart Rate: 80 Resp: 22 SpO2: 93 % O2 Device: None (Room air) Oxygen Delivery: 1 LPM Height: 165.1 cm (5' 5\") Weight: 115.5 kg (254 lb 10.1 oz) (standing )  Estimated body mass index is 42.37 kg/(m^2) as calculated from the following:    Height as of this encounter: 1.651 m (5' 5\").    Weight as of this encounter: 115.5 kg (254 lb 10.1 oz).        Awake, alert, mildly distractible woman sitting in chair in ICU.  HEENT: Pupils equal, conjugate. No icterus or nystagmus. Oral mucosa moist. No facial asymmetry.   Neck: Supple, jugular veins difficult to determine but not clearly elevated. Trachea midline   Chest: No chest wall movement asymmetry. Aeration mildly diminished at bases. Accessory muscles not in use. Expiratory time not increased. No tidal wheezes. Basilar medium rhonchi. No discrete crackles.   Cardiac: PMI not displaced. S1, S2 unremarkable. No S3, S4. P2 not accentuated. No murmurs. Carotid upstroke preserved.  "   Abdomen: Soft, obese. No palpation or percussion tenderness. No distention. Normoactive bowel sounds. Liver and spleen not increased in size. No bruits, masses, or pulsations.   Extremities: moderate bilaterally equal.  Lower extremity edema.  Extremities warm distally.  Brisk capillary refill. No eccymoses, clubbing.   Neurologic: Mental state above. Motor 5/5 and bilaterally equal. Tone preserved. No fasiculations or tremors. Sensation intact to light touch. DTR 2/4 and bilaterally equal.     Discharge Disposition  Discharged to home  Condition at discharge: Stable    Consultations This Hospital Stay  None    Time Spent on This Encounter  I, Ethan Bustos, personally saw the patient today and spent greater than 30 minutes discharging this patient.    Discharge Orders    NM Lexiscan stress test   The type of stress to be performed (pharmacologic or physical) will be at the discretion of the supervising physician as per department protocol.     Reason for your hospital stay   Hermelinda Murray is a 71 year old woman who presented for evaluation of chest discomfort. The patient has a prior history of atrial flutter and possible atrial fibrillation treated i n the past she had been on warfarin and sotalol which she discontinued because of concerns of medication side effects. Evaluation showed atrial fibrillation, which converted after a short period of treatment with IV diltiazem with transition to metoprolol. Troponin showed transient elevation, felt related to demand. She is discharge with metoprolol and rivarobaxan for anticoagulation with plans for outpatient stress testing.     Follow-up and recommended labs and tests    Follow up with primary care provider, BI Tian, within 7-10 days for hospital follow- up.    Cardiac stress test to be arranged     Activity   Your activity upon discharge: activity as tolerated     DNR/DNI     Diet   Follow this diet upon discharge: Orders Placed This Encounter  Advance  Diet as Tolerated: Cardiac       Discharge Medications  Discharge Medication List as of 1/19/2017  2:10 PM      START taking these medications    Details   acetaminophen (TYLENOL) 325 MG tablet Take 1 tablet (325 mg) by mouth every 4 hours as needed for mild pain, Disp-100 tablet, OTC      nitroglycerin (NITROSTAT) 0.4 MG sublingual tablet 1 tablet under the tongue every 5 minutes for chest discomfort, Disp-25 tablet, R-3, E-Prescribe      metoprolol (LOPRESSOR) 25 MG tablet Take 1 tablet (25 mg) by mouth 2 times daily, Disp-60 tablet, R-3, E-Prescribe      rivaroxaban ANTICOAGULANT (XARELTO) 20 MG TABS tablet Take 1 tablet (20 mg) by mouth daily (with dinner), Disp-30 tablet, R-3, E-Prescribe      sodium chloride (OCEAN) 0.65 % nasal spray Spray 1 spray into both nostrils daily as needed for congestion, Disp-1 Bottle, R-3, E-Prescribe      fluticasone (FLONASE) 50 MCG/ACT spray Spray 1-2 sprays into both nostrils daily, Disp-1 Bottle, R-11, E-Prescribe         CONTINUE these medications which have CHANGED    Details   aspirin EC 81 MG EC tablet Take 1 tablet (81 mg) by mouth daily, Disp-30 tablet, R-3, E-Prescribe         CONTINUE these medications which have NOT CHANGED    Details   Ascorbic Acid (VITAMIN C PO) Take 1,000 mg by mouth daily, Historical      betamethasone dipropionate (DIPROSONE) 0.05 % ointment Apply to elbow and cheek as needed for psoriasis flare upsDisp-45 g, [id number]      Multiple Vitamins-Minerals (MULTIVITAL PO) Historical      Cyanocobalamin (VITAMIN B-12) 5000 MCG SUBL Twice a week per pt., Historical      Probiotic Product (ACIDOPHILUS/GOAT MILK) CAPS Historical      cetirizine (ZYRTEC) 10 MG tablet Take 1 tablet (10 mg) by mouth daily, Disp-10 tablet, R-0, E-Prescribe           Allergies  Allergies   Allergen Reactions     Metal [Staples]      States cheap metal     Seasonal Allergies      Seasonal allergies; nasal congestion and cough.  Per 12/18/15, has history of reacting to  metal surgery staples and certain types of jewelry; allergy to cheap metal (scanned record).     Data  Most Recent 3 CBC's:  Recent Labs   Lab Test  01/19/17   0445  01/18/17   1745  08/02/16   1440   WBC  7.6  10.6  9.0   HGB  13.4  16.0*  15.8*   MCV  88  86  86   PLT  166  205  205      Most Recent 3 BMP's:  Recent Labs   Lab Test  01/19/17   0445  01/18/17   1745  08/02/16   1440   NA  143  138  139   POTASSIUM  4.2  4.2  4.2   CHLORIDE  105  100  103   CO2  31  27  31   BUN  10  14  9   CR  0.68  0.66  0.77   ANIONGAP  7  11  5   FREDERICK  8.1*  8.8  9.1   GLC  107*  173*  105*     Most Recent 2 LFT's:  Recent Labs   Lab Test  01/18/17   1745  08/02/16   1440   AST  57*  32   ALT  57*  38   ALKPHOS  106  83   BILITOTAL  0.6  1.0     Most Recent INR's and Anticoagulation Dosing History:        Anticoagulation Dose History     There is no flowsheet data to display.        Most Recent 3 Troponin's:  Recent Labs   Lab Test  01/19/17   1058  01/19/17   0445  01/18/17   1745   TROPI  0.056*  0.084*  <0.015  The 99th percentile for upper reference range is 0.045 ug/L.  Troponin values in   the range of 0.045 - 0.120 ug/L may be associated with risks of adverse   clinical events.       Most Recent Cholesterol Panel:  Recent Labs   Lab Test  01/19/17   0445   CHOL  166   LDL  84   HDL  42*   TRIG  198*     Most Recent 6 Bacteria Isolates From Any Culture (See EPIC Reports for Culture Details):  Recent Labs   Lab Test  01/18/17   2100  10/19/15   1300  09/22/15   1210   CULT  Culture in progress  No MRSA isolated  No MRSA isolated     Most Recent TSH, T4 and A1c Labs:  Recent Labs   Lab Test  01/18/17   1745   TSH  2.22   A1C  6.4*     Results for orders placed or performed during the hospital encounter of 01/18/17   XR Chest 2 Views    Narrative    CHEST TWO VIEWS    FINDINGS:  Lungs are clear of active pulmonary infiltrates.  The heart  and pulmonary vessels are within normal limits.  Costophrenic angles  are  valarie.    IMPRESSION:  NO EVIDENCE OF ACTIVE PULMONARY INFILTRATES.  Exam Date: Jan 18, 2017 07:07:00 PM  Author: ANNE RENTERIA  This report is final and signed

## 2017-01-19 NOTE — ED NOTES
Pt reports no chest pain at this time. States breathing feels better when she is laying on her side.

## 2017-01-19 NOTE — PLAN OF CARE
Patient discharged at 3:04 PM via ambulation accompanied by daughter and staff. Prescriptions sent to patients preferred pharmacy. All belongings sent with patient.     Discharge instructions reviewed with patient. Listed belongings gathered and returned to patient.     Patient discharged to home.       Core Measures and Vaccines  Core Measures applicable during stay: No. If yes, state diagnosis: NA  Pneumonia and Influenza given prior to discharge, if indicated: N/A    Surgical Patient   Surgical Procedures during stay: NA  Did patient receive discharge instruction on wound care and recognition of infection symptoms? N/A    MISC  Follow up appointment made:  Yes  Home and hospital aquired medications returned to patient: N/A  Patient reports pain was well managed at discharge: Yes

## 2017-01-19 NOTE — PLAN OF CARE
Discharge instructions given to patient. Went over meds. Instructions given for stress test. Waiting for daughter to come. Offers no other complaints.

## 2017-01-20 LAB
BACTERIA SPEC CULT: NORMAL
MICRO REPORT STATUS: NORMAL
SPECIMEN SOURCE: NORMAL

## 2017-01-20 NOTE — PROGRESS NOTES
Hermelinda Murray, was discharged to home on 1/19/2017 from [facility]. I spoke today with her regarding the patient's discharge.   She indicates they have receive(d) sufficient information upon discharge. Medications were reviewed in full on discharge, including: Medications to be started; medications to be stopped; medications to be continued from preadmission and any side effects. Prescriptions were received at discharge and were able to be filled. Medications are being taken as prescribed.   She indicates they have an appointment scheduled for 01/24/2017 and have transportation available. They are able to confirm their appointment time and have it written down.   She did not have any questions regarding discharge instructions or condition.  She was informed they may receive a survey in the mail from the hospital. Asked if they would kindly complete the survey in order for [facility] to know if services fully met patient needs.

## 2017-01-24 ENCOUNTER — OFFICE VISIT (OUTPATIENT)
Dept: FAMILY MEDICINE | Facility: OTHER | Age: 72
End: 2017-01-24
Attending: FAMILY MEDICINE
Payer: COMMERCIAL

## 2017-01-24 VITALS
TEMPERATURE: 97.9 F | SYSTOLIC BLOOD PRESSURE: 124 MMHG | OXYGEN SATURATION: 96 % | HEIGHT: 65 IN | DIASTOLIC BLOOD PRESSURE: 62 MMHG | HEART RATE: 95 BPM

## 2017-01-24 DIAGNOSIS — I48.0 PAROXYSMAL ATRIAL FIBRILLATION (H): ICD-10-CM

## 2017-01-24 DIAGNOSIS — I25.10 CORONARY ARTERY DISEASE INVOLVING NATIVE CORONARY ARTERY OF NATIVE HEART WITHOUT ANGINA PECTORIS: Primary | ICD-10-CM

## 2017-01-24 PROCEDURE — 99214 OFFICE O/P EST MOD 30 MIN: CPT | Performed by: FAMILY MEDICINE

## 2017-01-24 PROCEDURE — 99213 OFFICE O/P EST LOW 20 MIN: CPT

## 2017-01-24 ASSESSMENT — ANXIETY QUESTIONNAIRES
7. FEELING AFRAID AS IF SOMETHING AWFUL MIGHT HAPPEN: SEVERAL DAYS
5. BEING SO RESTLESS THAT IT IS HARD TO SIT STILL: NOT AT ALL
6. BECOMING EASILY ANNOYED OR IRRITABLE: NOT AT ALL
GAD7 TOTAL SCORE: 1
2. NOT BEING ABLE TO STOP OR CONTROL WORRYING: NOT AT ALL
3. WORRYING TOO MUCH ABOUT DIFFERENT THINGS: NOT AT ALL
1. FEELING NERVOUS, ANXIOUS, OR ON EDGE: NOT AT ALL

## 2017-01-24 ASSESSMENT — PATIENT HEALTH QUESTIONNAIRE - PHQ9: 5. POOR APPETITE OR OVEREATING: NOT AT ALL

## 2017-01-24 ASSESSMENT — PAIN SCALES - GENERAL: PAINLEVEL: NO PAIN (0)

## 2017-01-24 NOTE — NURSING NOTE
"Chief Complaint   Patient presents with     Hospital F/U     Follow up, admitted 01/18/2017 discharged 01/19/2017. Dx A Fib. Still having right sided neck pain. Patient has stopped Xeralto and Lopressor. Also states she is not doing her stress test        Initial /62 mmHg  Pulse 95  Temp(Src) 97.9  F (36.6  C) (Tympanic)  Ht 5' 5\" (1.651 m)  Wt   SpO2 96% Estimated body mass index is 42.37 kg/(m^2) as calculated from the following:    Height as of this encounter: 5' 5\" (1.651 m).    Weight as of 1/18/17: 254 lb 10.1 oz (115.5 kg).  BP completed using cuff size: liza FLAHERTY      "

## 2017-01-24 NOTE — PROGRESS NOTES
[facility]    Hermelinda Murray, 71 year old, female presents with   Chief Complaint   Patient presents with     Hospital F/U     Follow up, admitted 01/18/2017 discharged 01/19/2017. Dx paroxysmal A Fib. Still having right sided neck pain. Patient has stopped Xeralto and Lopressor. Also states she is not doing her stress test. She denies any further palpitations.     Musculoskeletal Problem     right hand tightness.        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     Chronic otitis externa 09/20/2012     Neck pain 09/20/2012     Breast cancer (H) 2006     lumpectomy and radiation     Hyperlipidemia      Stented coronary artery 2008     approximate date     Arthritis      Coronary artery disease      Paroxysmal atrial fibrillation (H) 1/19/2017     S/P total knee replacement using cement, 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     Type 2 diabetes mellitus (H) 6/13/2014     Overview:  Diagnosis 5/14 No microalbuminuria; 6/13/14      Morbid obesity (H) 4/1/2015       PAST SURGICAL HISTORY:  Past Surgical History   Procedure Laterality Date     Laparoscopic cholecystectomy       Hysterectomy       partial     Back surgery       x2     Back surgery       back fusion     Knee surgery       knee replacement, right     Lumpectomy breast  2005     left     Neck surgery  2010     left plugged neck artery with stent placement     Orthopedic surgery Right      TKA     Low back fusion  2000     L1,2,3,4     Stent  2008     coronary     Arthroplasty knee Left 10/19/2015     Procedure: ARTHROPLASTY KNEE;  Surgeon: Jaren James MD;  Location: [facility] OR     Colonoscopy  5-       MEDICATIONS:  Prior to Admission medications    Medication Sig Start Date End Date Taking? Authorizing Provider   aspirin 325 MG EC tablet One daily 1/24/17  " Yes BI Tian MD   acetaminophen (TYLENOL) 325 MG tablet Take 1 tablet (325 mg) by mouth every 4 hours as needed for mild pain 1/19/17  Yes Ethan Reid MD   nitroglycerin (NITROSTAT) 0.4 MG sublingual tablet 1 tablet under the tongue every 5 minutes for chest discomfort 1/19/17  Yes Ethan Reid MD   sodium chloride (OCEAN) 0.65 % nasal spray Spray 1 spray into both nostrils daily as needed for congestion 1/19/17  Yes Ethan Reid MD   fluticasone (FLONASE) 50 MCG/ACT spray Spray 1-2 sprays into both nostrils daily 1/19/17  Yes Ethan Reid MD   betamethasone dipropionate (DIPROSONE) 0.05 % ointment Apply to elbow and cheek as needed for psoriasis flare ups 11/22/16  Yes BI Tian MD   Multiple Vitamins-Minerals (MULTIVITAL PO)    Yes Reported, Patient   Cyanocobalamin (VITAMIN B-12) 5000 MCG SUBL Twice a week per pt.   Yes Reported, Patient   Probiotic Product (ACIDOPHILUS/GOAT MILK) CAPS    Yes Reported, Patient   cetirizine (ZYRTEC) 10 MG tablet Take 1 tablet (10 mg) by mouth daily 12/18/15  Yes BI Tian MD   Ascorbic Acid (VITAMIN C PO) Take 1,000 mg by mouth daily   Yes Reported, Patient       ALLERGIES:     Allergies   Allergen Reactions     Metal [Staples]      States cheap metal     Seasonal Allergies      Seasonal allergies; nasal congestion and cough.  Per 12/18/15, has history of reacting to metal surgery staples and certain types of jewelry; allergy to cheap metal (scanned record).     Zinc        ROS:  Constitutional, HEENT, cardiovascular, pulmonary, gi and gu systems are negative, except as otherwise noted.      EXAM:  /62 mmHg  Pulse 95  Temp(Src) 97.9  F (36.6  C) (Tympanic)  Ht 5' 5\" (1.651 m)  Wt   SpO2 96% There is no weight on file to calculate BMI.   GENERAL APPEARANCE: healthy, alert and no distress  EYES: Eyes grossly normal to inspection, PERRL and conjunctivae and sclerae normal  RESP: lungs clear to auscultation - no rales, " rhonchi or wheezes  CV: regular rates and rhythm, normal S1 S2, no S3 or S4 and no murmur, click or rub  NEURO: Normal strength and tone, mentation intact and speech normal  Lab/ X-ray  Results for orders placed or performed during the hospital encounter of 01/18/17   XR Chest 2 Views    Narrative    CHEST TWO VIEWS    FINDINGS:  Lungs are clear of active pulmonary infiltrates.  The heart  and pulmonary vessels are within normal limits.  Costophrenic angles  are sharp.    IMPRESSION:  NO EVIDENCE OF ACTIVE PULMONARY INFILTRATES.  Exam Date: Jan 18, 2017 07:07:00 PM  Author: ANNE RENTERIA  This report is final and signed     CBC with platelets differential   Result Value Ref Range    WBC 10.6 4.0 - 11.0 10e9/L    RBC Count 5.37 (H) 3.8 - 5.2 10e12/L    Hemoglobin 16.0 (H) 11.7 - 15.7 g/dL    Hematocrit 46.1 35.0 - 47.0 %    MCV 86 78 - 100 fl    MCH 29.8 26.5 - 33.0 pg    MCHC 34.7 31.5 - 36.5 g/dL    RDW 13.2 10.0 - 15.0 %    Platelet Count 205 150 - 450 10e9/L    Diff Method Automated Method     % Neutrophils 64.9 %    % Lymphocytes 24.7 %    % Monocytes 6.3 %    % Eosinophils 2.8 %    % Basophils 0.9 %    % Immature Granulocytes 0.4 %    Nucleated RBCs 0 0 /100    Absolute Neutrophil 6.9 1.6 - 8.3 10e9/L    Absolute Lymphocytes 2.6 0.8 - 5.3 10e9/L    Absolute Monocytes 0.7 0.0 - 1.3 10e9/L    Absolute Eosinophils 0.3 0.0 - 0.7 10e9/L    Absolute Basophils 0.1 0.0 - 0.2 10e9/L    Abs Immature Granulocytes 0.0 0 - 0.4 10e9/L    Absolute Nucleated RBC 0.0    Troponin I   Result Value Ref Range    Troponin I ES  0.000 - 0.045 ug/L     <0.015  The 99th percentile for upper reference range is 0.045 ug/L.  Troponin values in   the range of 0.045 - 0.120 ug/L may be associated with risks of adverse   clinical events.     Comprehensive metabolic panel   Result Value Ref Range    Sodium 138 133 - 144 mmol/L    Potassium 4.2 3.4 - 5.3 mmol/L    Chloride 100 94 - 109 mmol/L    Carbon Dioxide 27 20 - 32 mmol/L    Anion Gap  11 3 - 14 mmol/L    Glucose 173 (H) 70 - 99 mg/dL    Urea Nitrogen 14 7 - 30 mg/dL    Creatinine 0.66 0.52 - 1.04 mg/dL    GFR Estimate 87 >60 mL/min/1.7m2    GFR Estimate If Black >90   GFR Calc   >60 mL/min/1.7m2    Calcium 8.8 8.5 - 10.1 mg/dL    Bilirubin Total 0.6 0.2 - 1.3 mg/dL    Albumin 3.5 3.4 - 5.0 g/dL    Protein Total 7.8 6.8 - 8.8 g/dL    Alkaline Phosphatase 106 40 - 150 U/L    ALT 57 (H) 0 - 50 U/L    AST 57 (H) 0 - 45 U/L   Troponin I   Result Value Ref Range    Troponin I ES 0.084 (H) 0.000 - 0.045 ug/L   Magnesium   Result Value Ref Range    Magnesium 2.1 1.6 - 2.3 mg/dL   TSH   Result Value Ref Range    TSH 2.22 0.40 - 4.00 mU/L   Hemoglobin A1c   Result Value Ref Range    Hemoglobin A1C 6.4 (H) 4.3 - 6.0 %   Estimated Average Glucose   Result Value Ref Range    Estimated Average Glucose 137 mg/dL   CBC with platelets   Result Value Ref Range    WBC 7.6 4.0 - 11.0 10e9/L    RBC Count 4.56 3.8 - 5.2 10e12/L    Hemoglobin 13.4 11.7 - 15.7 g/dL    Hematocrit 40.0 35.0 - 47.0 %    MCV 88 78 - 100 fl    MCH 29.4 26.5 - 33.0 pg    MCHC 33.5 31.5 - 36.5 g/dL    RDW 13.2 10.0 - 15.0 %    Platelet Count 166 150 - 450 10e9/L   Basic metabolic panel   Result Value Ref Range    Sodium 143 133 - 144 mmol/L    Potassium 4.2 3.4 - 5.3 mmol/L    Chloride 105 94 - 109 mmol/L    Carbon Dioxide 31 20 - 32 mmol/L    Anion Gap 7 3 - 14 mmol/L    Glucose 107 (H) 70 - 99 mg/dL    Urea Nitrogen 10 7 - 30 mg/dL    Creatinine 0.68 0.52 - 1.04 mg/dL    GFR Estimate 86 >60 mL/min/1.7m2    GFR Estimate If Black >90   GFR Calc   >60 mL/min/1.7m2    Calcium 8.1 (L) 8.5 - 10.1 mg/dL   Lipid Profile   Result Value Ref Range    Cholesterol 166 <200 mg/dL    Triglycerides 198 (H) <150 mg/dL    HDL Cholesterol 42 (L) >49 mg/dL    LDL Cholesterol Calculated 84 <100 mg/dL    Non HDL Cholesterol 124 <130 mg/dL   Troponin I   Result Value Ref Range    Troponin I ES 0.056 (H) 0.000 - 0.045 ug/L    Echocardiogram    Narrative    ECHOCARDIOGRAM    M-mode, two-dimensional, color-flow, and Doppler studies were obtained  on this patient.  Standard views were utilized.    ORDERING PHYSICIAN:  Damir Talley MD    INDICATIONS:  Atrial fibrillation    Cardiac Dimensions                                    Normal  Dimensions  Aortic Root:                             34.8 mm      Aortic Root  Diameter: 20-37 mm  Left Atrium:                               41  mm      Left Atrium:  19-40 mm  Right Ventricle:                not measured      Right Ventricle:  7-23 mm  Left Ventricle end-diastole:    49.8 mm      Left Ventricle  end-diastole: 35-56 mm  Left Ventricle end-systole:     31.3 mm      Left Ventricle  end-systole: 35-56 mm  IVS end-diastole:                      8.8 mm      IVS end-diastole:  7-11 mm  LVPW:                                      8.8 mm      LVPW: 7-11 mm    Review of the study shows there is no pericardial effusion.  The left  ventricle is normal size. Ejection fraction 65%.  Left atrium is  slightly enlarged.  Right ventricle is normal on 2-D study.  The  mitral valve has a calcified posterior annulus, with no insufficiency.  The aortic valve appears to be normal.  There is slight tricuspid  regurgitation.   Peak systolic pressure of the right ventricle is  estimated at 18 plus the right atrium.  Patient is in sinus rhythm.            Continued on page 2        ASSESSMENT:  Echocardiographic study revealing  1.  Normal left ventricular size and systolic function. Ejection  fraction at 65%.  2.  Borderline left atrial enlargement.  3.  Normal right ventricle size and function.  4.  Calcified posterior mitral valve annulus with no regurgitation.  5.  Normal aortic valve.  6.  Slight tricuspid regurgitation.  Peak systolic pressure of the  right ventricle  is estimated at 18 plus the right atrium.  7.  The patient is in sinus rhythm.  Exam Date: Jan 19, 2017 08:02:53 AM  Author: NASIR  VIRGEN  This report is final and signed     Active MRSA Surveillance Culture   Result Value Ref Range    Specimen Description Nares     Culture Micro No MRSA isolated     Micro Report Status FINAL 01/20/2017          ASSESSMENT/PLAN:    ICD-10-CM    1. Coronary artery disease involving native coronary artery of native heart without angina pectoris I25.10 aspirin 325 MG EC tablet   2. Paroxysmal atrial fibrillation (H) I48.0 She was hospitalized with paroxysmal atrial fibrillation.  On exam she is in sinus rhythm now.  They set up a stress test for this week.  She adamantly does not want to undergo the stress test.  She does not want any surgery.  She states she is DNR and if something more severe happens to her she is content and let what happens happen.  On her own she stopped the Xarelto and  the metoprolol.  She had problems in the past with Xarelto following a DVT and also had troubles with Coumadin.  She was willing to increase her baby aspirin to an adult strength aspirin.  She does not want to see a cardiologist.  She knows she could have a recurrence of the atrial fibrillation or she could have a stroke or she could die of a sudden cardiac event and she does not want any further investigational workup.  She states she feels fine.  Her SHARRON was 1 and pH Q9 was 1. We canceled the stress test according to her wishes.  She adamantly told me she will not  undergo it so we canceled so someone else could take that slot.         KY Tian MD  January 24, 2017

## 2017-01-25 ASSESSMENT — ANXIETY QUESTIONNAIRES: GAD7 TOTAL SCORE: 1

## 2017-01-25 ASSESSMENT — PATIENT HEALTH QUESTIONNAIRE - PHQ9: SUM OF ALL RESPONSES TO PHQ QUESTIONS 1-9: 1

## 2017-01-30 NOTE — UTILIZATION REVIEW
"Admission Status; Secondary Review Determination      Primary Insurance:     Date Admitted:  01/18/2017   Date(s) Reviewed:  01/19/2017   Attending Physician:     Physician Advisor:  Tristan Knapp MD       Under the authority of the Utilization Management Committee, the utilization review process indicated a secondary review on the above patient. The review outcome is based on review of the medical records, discussions with staff, and applying clinical experience noted on the date of the review.             (  ) Inpatient Status Appropriate - This patient's medical care is consistent with medical management for inpatient care and reasonable inpatient medical practice.            (X) Observation Status Appropriate - This patient does not meet hospital inpatient criteria and is placed in observation status. If this patient's primary payer is Medicare and was admitted as an inpatient, Condition Code 44 should be used and patient status changed to \"observation\".                  (  ) Admission Status NOT Appropriate - This patient's medical care is not consistent with medical management for Inpatient or Observation Status.                  (  ) Outpatient Procedure Appropriate - This patient's medical care is consistent with medical management for outpatient procedure.                   (  ) Observation Concurrent Stay Review                  (  ) Inpatient Concurrent Stay Review                     RATIONALE FOR DETERMINATION      Hermelinda Murray is a 71-year-old female with a history of atrial fib and flutter in the past, non-alcoholic steatohepatitis, mild polycythemia vera and smoking disorder, who presented to the hospital with symptoms of sharp chest discomfort, atypical pain for cardiac disease.  She was found to have atrial fibrillation with a rapid ventricular response with rate in the 140s to 160s.  The patient at the time of admission was not on any rate control medications.  The patient was admitted for " initial rate control and to evaluate for medical management as an outpatient for patient's paroxysmal atrial fibrillation with rapid ventricular response.  Rule out any associated acute illness, such as acute coronary syndrome.  This would be appropriate for observation care to obtain adequate rate control versus spontaneous conversion.  If patient develops secondary problems like significant heart failure or significant myocardial injury, then at that time the patient would be appropriate to advance to inpatient services.  The case was discussed with treating physician.        The information on this document is developed by the utilization review team in order for the business office to ensure compliance. This only denotes the appropriateness of proper admission status and does not reflect the quality of care rendered.       The definitions of Inpatient Status and Observation Status used in making the determination above are those provided in the CMS Coverage Manual, Chapter 1 and Chapter 6, section 70.4.         AMY BUSTILLOS MD             D: 2017 09:49   T: 2017 12:16   MT: [email or web address]      Name:     ABIMBOLA RACHEL   MRN:      [id number]        Account:        [id number]   :      1945           Admit Date:                                       Discharge Date: 2017      Document: [id number]

## 2017-03-07 NOTE — ED AVS SNAPSHOT
[facility]    [address] [address]    Phone:  [phone]                                       Hermelinda Murray   MRN: [id number]    Department:  [facility]   Date of Visit:  3/7/2017           After Visit Summary Signature Page     I have received my discharge instructions, and my questions have been answered. I have discussed any challenges I see with this plan with the nurse or doctor.    ..........................................................................................................................................  Patient/Patient Representative Signature      ..........................................................................................................................................  Patient Representative Print Name and Relationship to Patient    ..................................................               ................................................  Date                                            Time    ..........................................................................................................................................  Reviewed by Signature/Title    ...................................................              ..............................................  Date                                                            Time

## 2017-03-07 NOTE — ED PROVIDER NOTES
"  History   No chief complaint on file.    The history is provided by the patient. No  was used.     Hermelinda Murray is a 71 year old female who \"One week hx of low back pain. Hx of 3 back surgeries with fusion and hardware, last 1999. No difficulties with urination or bowels from her normal. Denies tingling and numbness. \"  Denies any new injury/fall.    Allergies as of 03/07/2017 - Georges as Reviewed 03/07/2017   Allergen Reaction Noted     Metal [staples]  06/30/2015     Seasonal allergies  03/30/2016     Zinc  01/24/2017     Discharge Medication List as of 3/7/2017 11:20 AM      START taking these medications    Details   cyclobenzaprine (FLEXERIL) 10 MG tablet Take half to one tablet every 8 hours as needed for muscle pain/spasms, Disp-30 tablet, R-0, E-Prescribe      traMADol (ULTRAM) 50 MG tablet Take half to one tablet every 8 hours as needed for pain, Disp-10 tablet, R-0, Local Print         CONTINUE these medications which have NOT CHANGED    Details   aspirin 325 MG EC tablet One daily, Disp-3 tablet, R-0, Historical      betamethasone dipropionate (DIPROSONE) 0.05 % ointment Apply to elbow and cheek as needed for psoriasis flare upsDisp-45 g, [id number]      Multiple Vitamins-Minerals (MULTIVITAL PO) Historical      Probiotic Product (ACIDOPHILUS/GOAT MILK) CAPS Historical      acetaminophen (TYLENOL) 325 MG tablet Take 1 tablet (325 mg) by mouth every 4 hours as needed for mild pain, Disp-100 tablet, OTC      nitroglycerin (NITROSTAT) 0.4 MG sublingual tablet 1 tablet under the tongue every 5 minutes for chest discomfort, Disp-25 tablet, R-3, E-Prescribe      Cyanocobalamin (VITAMIN B-12) 5000 MCG SUBL Twice a week per pt., Historical      Ascorbic Acid (VITAMIN C PO) Take 1,000 mg by mouth daily, Historical         STOP taking these medications       HYDROcodone-acetaminophen (NORCO) 5-325 MG per tablet Comments:   Reason for Stopping:             Past Medical History   Diagnosis " Date     Arthritis      Breast cancer (H) 2006     lumpectomy and radiation     Calculus of gallbladder without mention of cholecystitis or obstruction 11/07/2002     Chronic otitis externa 09/20/2012     Coronary artery disease      Hyperlipidemia      Morbid obesity (H) 4/1/2015     Neck pain 09/20/2012     Neoplasm, 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     Other specified pre-operative examination 11/12/2002     Paroxysmal atrial fibrillation (H) 1/19/2017     S/P total knee replacement using cement, 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     Stented coronary artery 2008     approximate date     Type 2 diabetes mellitus (H) 6/13/2014     Overview:  Diagnosis 5/14 No microalbuminuria; 6/13/14      Past Surgical History   Procedure Laterality Date     Laparoscopic cholecystectomy       Hysterectomy       partial     Back surgery       x2     Back surgery       back fusion     Knee surgery       knee replacement, right     Lumpectomy breast  2005     left     Neck surgery  2010     left plugged neck artery with stent placement     Orthopedic surgery Right      TKA     Low back fusion  2000     L1,2,3,4     Stent  2008     coronary     Arthroplasty knee Left 10/19/2015     Procedure: ARTHROPLASTY KNEE;  Surgeon: Jaren James MD;  Location: [facility]     Colonoscopy  5-     Family History   Problem Relation Age of Onset     CANCER Mother 74     unsure where it began; cause of death     HEART DISEASE Sister      disease     HEART DISEASE Brother      disease     Other - See Comments Father 69     myocardial infarction; cause of death     Social History     Social History     Marital status: Single     Spouse name: N/A     Number of children: N/A     Years of education: N/A     Social History Main Topics     Smoking status: Current Some Day Smoker     Packs/day: 0.25     Years: 55.00     Types: Cigarettes     Smokeless tobacco: Never Used      Comment: tried to quit, no passive exposure, longest tobacco free: 2 months     Alcohol use No       Comment: quit 2002     Drug use: No     Sexual activity: No     Other Topics Concern     Blood Transfusions No     Caffeine Concern Yes     4 cups coffee daily     Social History Narrative         I have reviewed the Medications, Allergies, Past Medical and Surgical History, and Social History in the Epic system.    Review of Systems   Constitutional: Negative.    HENT: Negative.    Eyes: Negative for photophobia and visual disturbance.   Cardiovascular: Negative.    Musculoskeletal: Positive for back pain and myalgias.   Neurological: Negative.    Psychiatric/Behavioral: Negative.        Physical Exam   BP: (!) 142/103  Pulse: 98  Temp: 96.6  F (35.9  C)  Resp: 18  SpO2: 94 % (has acrylic painted nails, difficult to obtain)  Physical Exam   Constitutional: She is oriented to person, place, and time. She appears well-developed and well-nourished. No distress.   Cardiovascular: Normal rate.    Pulmonary/Chest: Effort normal.   Musculoskeletal:   Back: no e/e/e/e. Moderate TTP to SI joints and surrounding muscles, right worse than left.  BLE: no loss of ROM, m/n/v grossly intact, 5/5 strength, NL for her   Neurological: She is alert and oriented to person, place, and time.   Skin: She is not diaphoretic.   Psychiatric: She has a normal mood and affect.   Nursing note and vitals reviewed.      ED Course     ED Course     Procedures         8/4/15  2:58 PM 8/4/15  3:16 PM [id number] [facility]    [facility] Interactive Report and [facility]   View the interactive report   Study Result         LUMBAR SPINE MRI  HISTORY: A 69-year-old female with low back pain.     COMPARISON: Today's study is compared to a prior examination which is  dated January 11, 2010.     FINDINGS: Again seen are changes of lumbar spine surgery. There is  posterior lumbar fusion with pedicle screw fixation from L3 through  S1. There is anterior interbody fusion at L3-L4, L4-L5 and L5-S1.  These surgical changes were present on the prior study.     The conus  medullaris is at T12-L1. Alignment is normal. There is no  evidence of subluxation or fracture.     L5-S1: There is solid bony interbody fusion. The central spinal  canal is patent. The neural foramina are patent.     L4-L5: There is solid bony interbody fusion. The central spinal  canal is patent. The neural foramina are patent.     L3-L4: An interbody bone graft is visible to the left of midline.  There appears to be left-sided interbody fusion. Disk space height  and signal at and to the right of midline is decreased. There is a  mild broad-based disk bulge. The central canal and neural foramina  are patent.     L2-L3: There is severe loss of disk height. There is a large  posterior broad-based disk protrusion versus disk herniation. This  causes moderate to severe central spinal canal stenosis. There is  slight buckling of the ligamentum flavum. The neural foramina are  mildly narrowed. This disk protrusion has increased in size from the  prior study. Severity of disk space narrowing has worsened since the  prior study.  Continued on page 2...           L1-L2: There is mild loss of disk height and signal. A mild  broad-based disk bulge is present. The central spinal canal and  neural foramina are patent.     IMPRESSION:  1. MODERATE TO LARGE BROAD-BASED DISK PROTRUSION VERSUS HERNIATION AT  THE L2-L3 LEVEL. THIS CAUSES MODERATE TO SEVERE CENTRAL SPINAL CANAL  STENOSIS. THIS DISK PROTRUSION WAS PRESENT ON THE PRIOR STUDY, BUT IS  LARGER ON TODAY'S EXAMINATION. SEVERITY OF CENTRAL CANAL NARROWING AT  THIS LEVEL HAS WORSENED.     2. AGAIN SEEN ARE CHANGES OF POSTERIOR AND ANTERIOR LUMBAR SPINE  FUSION FROM L3 THROUGH L5. THERE IS POSTERIOR PEDICLE SCREW AND TERESITA  FIXATION WITH ANTERIOR INTERBODY FUSION.     3. THERE IS SOME ENHANCEMENT IN THE POSTERIOR PARASPINOUS SOFT  TISSUES AND EPIDURAL SPACE AT THE L2-L3 LEVEL. THIS MAY BE  ARTIFACTUAL IN ORIGIN DUE TO DISRUPTED FAT SATURATION OR MAY BE  REACTIVE IN  NATURE.  Exam Date: Aug 04, 2015 03:16:47 PM  Author: YUNIER LYLES  This report is final and signed         Assessments & Plan (with Medical Decision Making)     I have reviewed the nursing notes.    I have reviewed the findings, diagnosis, plan and need for follow up with the patient.    Discharge Medication List as of 3/7/2017 11:20 AM      START taking these medications    Details   cyclobenzaprine (FLEXERIL) 10 MG tablet Take half to one tablet every 8 hours as needed for muscle pain/spasms, Disp-30 tablet, R-0, E-Prescribe      traMADol (ULTRAM) 50 MG tablet Take half to one tablet every 8 hours as needed for pain, Disp-10 tablet, R-0, Local Print             Final diagnoses:   Sacroiliitis (H) - Bilateral   Elevated blood pressure reading without diagnosis of hypertension           Patient verbally educated and given appropriate education sheets for the diagnoses and has no questions.  Take medications as directed.   Follow up with your Primary Care provider on 14 March 2017 at 1430 for reevaluation.  if concerns develop, return to the ER  Denise Gentile Certified  Physician Assistant  3/7/2017  11:52 AM  URGENT CARE CLINIC      3/7/2017   [facility]     Denise Gentile PA  03/07/17 1505

## 2017-03-07 NOTE — ED NOTES
One week hx of low back pain. Hx of 3 back surgeries with fusion and hardware, last 1999. No difficulties with urination or bowels from her normal. Denies tingling and numbness.

## 2017-03-07 NOTE — ED NOTES
Pt presents today with daughter for c/o back pain and was advised by PCP to come here for a CT or MRI.

## 2017-03-07 NOTE — ED NOTES
Discussed with Denise ORTIZ regarding plan of care and if should be seen in ED. Pt will be seen in UC.

## 2017-03-07 NOTE — ED AVS SNAPSHOT
[facility]    [address]    [facility] [place] [address]    Phone:  [phone]                                       Hermelinda Murary   MRN: [id number]    Department:  [facility]   Date of Visit:  3/7/2017           Patient Information     Date Of Birth          1945        Your diagnoses for this visit were:     Sacroiliitis (H) Bilateral    Elevated blood pressure reading without diagnosis of hypertension        You were seen by Denise Gentile PA.      Follow-up Information     Follow up with BI Tian MD.    Specialty:  Family Practice    Why:  On14 March 2017 at 2:30 show time, for reevaluation    Contact information:    [facility] [place]  [address]  [facility] [address]  [phone]          Follow up with [facility].    Specialty:  EMERGENCY MEDICINE    Why:  If further concerns develop    Contact information:    [address] [address] [phone]    Additional information:    From [place] Area: Take [address]. Turn left at [address]. Turn left at the first stoplight on [address] [place] [address]. At the first stop sign, take a right onto [address]. Take a left into the parking lot and continue through until you reach the North enterance of the building.       From [place]: Take [address]. Take the [address] ramp towards [place]. Turn left onto [address]. Take a slight right onto [address]. Turn left at the first stoplight on [address]. At the first stop sign, take a right onto [address]. Take a left into the parking lot and continue through until you reach the North enterance of the building.       From [place]: Take [address]. Take a right at [address]. At the first stop sign, take a right onto [address]. Take a left into the parking lot and continue through until you reach the North enterance of the building.       Discharge  References/Attachments     SACROILIITIS (ENGLISH)    HIGH BLOOD PRESSURE, YOUR RISK FACTORS (ENGLISH)    HYPERTENSION, TO BE CONFIRMED (ENGLISH)      Future Appointments        Provider Department Dept Phone Center    3/14/2017 2:45 PM BI Tian MD [facility] [phone] [place]         Review of your medicines      START taking        Dose / Directions Last dose taken    cyclobenzaprine 10 MG tablet   Commonly known as:  FLEXERIL   Quantity:  30 tablet        Take half to one tablet every 8 hours as needed for muscle pain/spasms   Refills:  0        traMADol 50 MG tablet   Commonly known as:  ULTRAM   Quantity:  10 tablet        Take half to one tablet every 8 hours as needed for pain   Refills:  0          Our records show that you are taking the medicines listed below. If these are incorrect, please call your family doctor or clinic.        Dose / Directions Last dose taken    acetaminophen 325 MG tablet   Commonly known as:  TYLENOL   Dose:  325 mg   Quantity:  100 tablet        Take 1 tablet (325 mg) by mouth every 4 hours as needed for mild pain   Refills:  0        Acidophilus/Goat Milk Caps        Refills:  0        aspirin  MG EC tablet   Quantity:  3 tablet        One daily   Refills:  0        betamethasone dipropionate 0.05 % ointment   Commonly known as:  DIPROSONE   Quantity:  45 g        Apply to elbow and cheek as needed for psoriasis flare ups   Refills:  0        MULTIVITAL PO        Refills:  0        nitroglycerin 0.4 MG sublingual tablet   Commonly known as:  NITROSTAT   Quantity:  25 tablet        1 tablet under the tongue every 5 minutes for chest discomfort   Refills:  3        Vitamin B-12 5000 MCG Subl        Twice a week per pt.   Refills:  0        VITAMIN C PO   Dose:  1000 mg        Take 1,000 mg by mouth daily   Refills:  0          STOP taking        Dose Reason for stopping Comments    HYDROcodone-acetaminophen 5-325 MG per tablet   Commonly known as:   "NORCO                      Prescriptions were sent or printed at these locations (2 Prescriptions)                   [facility] - [place], [place] - [address] AT [facility] [address]   [address], [place] [address]    Telephone:  [phone]   Fax:  [phone]   Hours:                  E-Prescribed (1 of 2)         cyclobenzaprine (FLEXERIL) 10 MG tablet                 Printed at Department/Unit printer (1 of 2)         traMADol (ULTRAM) 50 MG tablet                Procedures and tests performed during your visit     Lumbar spine XR, 2-3 views      Orders Needing Specimen Collection     None      Pending Results     Date and Time Order Name Status Description    3/7/2017 1046 Lumbar spine XR, 2-3 views In process             Pending Culture Results     No orders found from 3/5/2017 to 3/8/2017.            Thank you for choosing [facility]       Thank you for choosing [facility] for your care. Our goal is always to provide you with excellent care. Hearing back from our patients is one way we can continue to improve our services. Please take a few minutes to complete the written survey that you may receive in the mail after you visit with us. Thank you!        [facility] Information     [employer] lets you send messages to your doctor, view your test results, renew your prescriptions, schedule appointments and more. To sign up, go to [email or web address] . Click on \"Log in\" on the left side of the screen, which will take you to the Welcome page. Then click on \"Sign up Now\" on the right side of the page.     You will be asked to enter the access code listed below, as well as some personal information. Please follow the directions to create your username and password.     Your access code is: [email or web address]  Expires: 2017  1:19 PM     Your access code will  in 90 days. If you need help or a new code, please call your [facility] clinic or [phone].        Care EveryWhere ID     This is " your Care EveryWhere ID. This could be used by other organizations to access your [place] medical records  [id number]        After Visit Summary       This is your record. Keep this with you and show to your community pharmacist(s) and doctor(s) at your next visit.

## 2017-03-08 NOTE — PROGRESS NOTES
Lumbar Spine XR report routed to PCP, Dr. KY Tian.  Impression - PROMINENT OSTEOPENIA OF THE L4 AND L5 VERTEBRAL BODIES, AS WELL AS THE INFERIOR ASPECT OF THE L3 VERTEBRAL BODY.  Pt has follow up appt 3/14/17 with Dr. Tian.

## 2017-03-14 NOTE — PROGRESS NOTES
[facility]    Hermelinda Murray, 71 year old, female presents with   Chief Complaint   Patient presents with     Musculoskeletal Problem     Follow up from the  seen 03/07/2017. Lower back pain radiating to the left side and hip. Also elevated bp. Duration-3 weeks. She's had previous  Back surgery and has rods placed.she has significant pain in the Ultram and muscle relaxors did not help the pain and actually cause urine incontinence.  No specific trauma associated with  this.  Pain is quite debilitating and she is here with her sister and daughter and they want something done now.       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     Stented coronary artery 2008     approximate date     Type 2 diabetes mellitus (H) 6/13/2014     Overview:  Diagnosis 5/14 No microalbuminuria; 6/13/14        PAST SURGICAL HISTORY:  Past Surgical History   Procedure Laterality Date     Laparoscopic cholecystectomy       Hysterectomy       partial     Back surgery       x2     Back surgery       back fusion     Knee surgery       knee replacement, right     Lumpectomy breast  2005     left     Neck surgery  2010     left plugged neck artery with stent placement     Orthopedic surgery Right      TKA     Low back fusion  2000     L1,2,3,4     Stent  2008     coronary     Arthroplasty knee Left 10/19/2015     Procedure: ARTHROPLASTY KNEE;  Surgeon: Jaren James MD;  Location: [facility]     Colonoscopy   "5-       MEDICATIONS:  Prior to Admission medications    Medication Sig Start Date End Date Taking? Authorizing Provider   HYDROcodone-acetaminophen (NORCO) 5-325 MG per tablet Take 1 tablet by mouth every 6 hours as needed for moderate to severe pain Maximum 4 tablet(s) per day 3/14/17  Yes BI Tian MD   aspirin 325 MG EC tablet One daily 1/24/17  Yes BI Tian MD   acetaminophen (TYLENOL) 325 MG tablet Take 1 tablet (325 mg) by mouth every 4 hours as needed for mild pain 1/19/17  Yes Ethan Reid MD   nitroglycerin (NITROSTAT) 0.4 MG sublingual tablet 1 tablet under the tongue every 5 minutes for chest discomfort 1/19/17  Yes Ethan Reid MD   betamethasone dipropionate (DIPROSONE) 0.05 % ointment Apply to elbow and cheek as needed for psoriasis flare ups 11/22/16  Yes BI Tian MD   Multiple Vitamins-Minerals (MULTIVITAL PO)    Yes Reported, Patient   Cyanocobalamin (VITAMIN B-12) 5000 MCG SUBL Twice a week per pt.   Yes Reported, Patient   Probiotic Product (ACIDOPHILUS/GOAT MILK) CAPS    Yes Reported, Patient   Ascorbic Acid (VITAMIN C PO) Take 1,000 mg by mouth daily   Yes Reported, Patient       ALLERGIES:     Allergies   Allergen Reactions     Metal [Staples]      States cheap metal     Seasonal Allergies      Seasonal allergies; nasal congestion and cough.  Per 12/18/15, has history of reacting to metal surgery staples and certain types of jewelry; allergy to cheap metal (scanned record).     Zinc        ROS:  Constitutional, neuro, ENT, endocrine, pulmonary, cardiac, gastrointestinal, genitourinary, musculoskeletal, integument and psychiatric systems are negative, except as otherwise noted.      EXAM:  /82 (BP Location: Left arm, Patient Position: Chair, Cuff Size: Adult Large)  Pulse 110  Temp 96.5  F (35.8  C) (Tympanic)  Resp (!) 94  Ht 5' 5\" (1.651 m) There is no height or weight on file to calculate BMI.   GENERAL APPEARANCE: pleasant female sitting in a " wheelchair, alert and no distress  EYES: Eyes grossly normal to inspection, PERRL and conjunctivae and sclerae normal  RESP: lungs clear to auscultation - no rales, rhonchi or wheezes  ORTHO: has tenderness to lumbar spine with radiation to the right side.  Negative radicular type pain.  NEURO: Normal strength and tone, mentation intact and speech normal  PSYCH: mentation appears normal and affect normal/bright  Lab/ X-ray  Results for orders placed or performed during the hospital encounter of 03/07/17   Lumbar spine XR, 2-3 views    Narrative    LUMBAR SPINE SERIES    CLINICAL HISTORY:  Pain.    COMPARISON:  Today's study is compared to a prior MRI of the lumbar  spine which is dated August 4, 2015.    FINDINGS:  Postoperative changes are seen, consistent with posterior  fusion at L3 through S1.  There is prominent osteopenia of the L4 and  L5 vertebral bodies.  Metallic components appear to be intact.    IMPRESSION:  PROMINENT OSTEOPENIA OF THE L4 AND L5 VERTEBRAL BODIES,  AS WELL AS THE INFERIOR ASPECT OF THE L3 VERTEBRAL BODY.  NO EVIDENCE  OF ACUTE FRACTURE.  Exam Date: Mar 07, 2017 11:13:00 AM  Author: CONNIE COPE  This report is final and signed           ASSESSMENT/PLAN:    ICD-10-CM    1. Acute midline low back pain without sciatica M54.5 HYDROcodone-acetaminophen (NORCO) 5-325 MG per tablet. She will not take the muscle relaxer or tramadol.  Wrote for hydrocodone 5/325..  CT lumbar spine ordered and patient will follow up with Dr. Copeland on Friday, March 17.  Note to the previous visit to the hospital she was told to take a blood thinner and a beta blocker for atrial fibrillation and she refused.     CT Lumbar Spine w/o Contrast         RBrook Tian MD  March 14, 2017

## 2017-03-14 NOTE — MR AVS SNAPSHOT
"              After Visit Summary   3/14/2017    Hermelinda Murray    MRN: [id number]           Patient Information     Date Of Birth          1945        Visit Information        Provider Department      3/14/2017 2:45 PM BI Tian MD [facility]        Today's Diagnoses     Acute midline low back pain without sciatica    -  1      Care Instructions    Follow up with one of my partners.        Follow-ups after your visit        Who to contact     If you have questions or need follow up information about today's clinic visit or your schedule please contact [facility] directly at [phone].  Normal or non-critical lab and imaging results will be communicated to you by MyChart, letter or phone within 4 business days after the clinic has received the results. If you do not hear from us within 7 days, please contact the clinic through Profilepasser[employer] or phone. If you have a critical or abnormal lab result, we will notify you by phone as soon as possible.  Submit refill requests through [employer] or call your pharmacy and they will forward the refill request to us. Please allow 3 business days for your refill to be completed.          Additional Information About Your Visit        MyChart Information     PlayMobs lets you send messages to your doctor, view your test results, renew your prescriptions, schedule appointments and more. To sign up, go to [email or web address] . Click on \"Log in\" on the left side of the screen, which will take you to the Welcome page. Then click on \"Sign up Now\" on the right side of the page.     You will be asked to enter the access code listed below, as well as some personal information. Please follow the directions to create your username and password.     Your access code is: [email or web address]  Expires: 2017  2:19 PM     Your access code will  in 90 days. If you need help or a new code, please call your [facility] or [phone].        Care " "EveryWhere ID     This is your Care EveryWhere ID. This could be used by other organizations to access your [facility] medical records  [id number]        Your Vitals Were     Pulse Temperature Respirations Height          110 96.5  F (35.8  C) (Tympanic) 94 5' 5\" (1.651 m)         Blood Pressure from Last 3 Encounters:   03/14/17 148/82   03/07/17 (!) 142/103   01/24/17 124/62    Weight from Last 3 Encounters:   01/19/17 254 lb 10.1 oz (115.5 kg)   08/11/16 235 lb (106.6 kg)   03/30/16 245 lb (111.1 kg)              We Performed the Following     CT Lumbar Spine w/o Contrast          Today's Medication Changes          These changes are accurate as of: 3/14/17  2:46 PM.  If you have any questions, ask your nurse or doctor.               Start taking these medicines.        Dose/Directions    HYDROcodone-acetaminophen 5-325 MG per tablet   Commonly known as:  NORCO   Used for:  Acute midline low back pain without sciatica   Started by:  BI Tian MD        Dose:  1 tablet   Take 1 tablet by mouth every 6 hours as needed for moderate to severe pain Maximum 4 tablet(s) per day   Quantity:  60 tablet   Refills:  0         Stop taking these medicines if you haven't already. Please contact your care team if you have questions.     cyclobenzaprine 10 MG tablet   Commonly known as:  FLEXERIL   Stopped by:  BI Tian MD           traMADol 50 MG tablet   Commonly known as:  ULTRAM   Stopped by:  BI Tian MD                Where to get your medicines      Some of these will need a paper prescription and others can be bought over the counter.  Ask your nurse if you have questions.     Bring a paper prescription for each of these medications     HYDROcodone-acetaminophen 5-325 MG per tablet                Primary Care Provider Office Phone # Fax #    BI Tian [phone]       [facility] [place] [address] [place] [id number]        Thank you!     Thank you for choosing " [facility] [place]  for your care. Our goal is always to provide you with excellent care. Hearing back from our patients is one way we can continue to improve our services. Please take a few minutes to complete the written survey that you may receive in the mail after your visit with us. Thank you!             Your Updated Medication List - Protect others around you: Learn how to safely use, store and throw away your medicines at www.disposemymeds.org.          This list is accurate as of: 3/14/17  2:46 PM.  Always use your most recent med list.                   Brand Name Dispense Instructions for use    acetaminophen 325 MG tablet    TYLENOL    100 tablet    Take 1 tablet (325 mg) by mouth every 4 hours as needed for mild pain       Acidophilus/Goat Milk Caps          aspirin  MG EC tablet     3 tablet    One daily       betamethasone dipropionate 0.05 % ointment    DIPROSONE    45 g    Apply to elbow and cheek as needed for psoriasis flare ups       HYDROcodone-acetaminophen 5-325 MG per tablet    NORCO    60 tablet    Take 1 tablet by mouth every 6 hours as needed for moderate to severe pain Maximum 4 tablet(s) per day       MULTIVITAL PO          nitroglycerin 0.4 MG sublingual tablet    NITROSTAT    25 tablet    1 tablet under the tongue every 5 minutes for chest discomfort       Vitamin B-12 5000 MCG Subl      Twice a week per pt.       VITAMIN C PO      Take 1,000 mg by mouth daily

## 2017-03-14 NOTE — NURSING NOTE
"Chief Complaint   Patient presents with     Musculoskeletal Problem     Follow up from the  seen 03/07/2017. Lower back pain radiating to the left side and hip. Also elevated bp. Duration-3 weeks        Initial /82 (BP Location: Left arm, Patient Position: Chair, Cuff Size: Adult Large)  Pulse 110  Temp 96.5  F (35.8  C) (Tympanic)  Resp (!) 94  Ht 5' 5\" (1.651 m) Estimated body mass index is 42.37 kg/(m^2) as calculated from the following:    Height as of 1/18/17: 5' 5\" (1.651 m).    Weight as of 1/19/17: 254 lb 10.1 oz (115.5 kg).  Medication Reconciliation: complete     NORBERTO FLAHERTY      "

## 2017-03-17 NOTE — NURSING NOTE
"Chief Complaint   Patient presents with     Results     CT results        Initial /72  Pulse 110  Resp 20  SpO2 91% Estimated body mass index is 42.37 kg/(m^2) as calculated from the following:    Height as of 1/18/17: 5' 5\" (1.651 m).    Weight as of 1/19/17: 254 lb 10.1 oz (115.5 kg).  Medication Reconciliation: complete   Fela Gonzáles      "

## 2017-03-20 NOTE — TELEPHONE ENCOUNTER
Patient called and left message on my voicemail. Something about wondering about injection for her back.     Would someone please look into this and call the patient with information.    Thank You  [phone]

## 2017-03-21 NOTE — PROGRESS NOTES
SUBJECTIVE:  Hermelinda Murray, 71 year old, female is seen in follow up of low back pain.  Her symptoms persist and she notes radiation to left back and hip.  She has a history of significant lumbar disc disease and has previously underwent what appears to be laminectomy and fusion.  CT lumbar spine was performed which showed:    1. NO EVIDENCE SUGGESTING ACUTE OR SUBACUTE FRACTURE.     2. RELATIVELY SEVERE CENTRAL CANAL STENOSIS AT L2-L3 SECONDARY TO  CHRONIC BULGE AND ENDPLATE SPURRING. THIS IS SIMILAR IN APPEARANCE  COMPARED TO THE PREVIOUS MRI. THIS MRI DID DEMONSTRATE MASS EFFECT  AND COMPRESSION OF THE SAC AS WELL AS ITS CONTENTS.     3. PROMINENT CALCIFICATIONS WITHIN THE ABDOMINAL AORTA.    This is reviewed in detail.  Her current pain regimen is also reviewed.    Denies bowel dysfunction, bladder dysfunction, saddle anesthesia, nocturnal symptoms, focal weakness,  or trauma.    Current Outpatient Prescriptions   Medication Sig Dispense Refill     HYDROcodone-acetaminophen (NORCO) 5-325 MG per tablet Take 1 tablet by mouth every 6 hours as needed for moderate to severe pain Maximum 4 tablet(s) per day 60 tablet 0     aspirin 325 MG EC tablet One daily 3 tablet 0     acetaminophen (TYLENOL) 325 MG tablet Take 1 tablet (325 mg) by mouth every 4 hours as needed for mild pain 100 tablet      nitroglycerin (NITROSTAT) 0.4 MG sublingual tablet 1 tablet under the tongue every 5 minutes for chest discomfort 25 tablet 3     betamethasone dipropionate (DIPROSONE) 0.05 % ointment Apply to elbow and cheek as needed for psoriasis flare ups 45 g 0     Multiple Vitamins-Minerals (MULTIVITAL PO)        Cyanocobalamin (VITAMIN B-12) 5000 MCG SUBL Twice a week per pt.       Probiotic Product (ACIDOPHILUS/GOAT MILK) CAPS        Ascorbic Acid (VITAMIN C PO) Take 1,000 mg by mouth daily          Allergies   Allergen Reactions     Metal [Staples]      States cheap metal     Seasonal Allergies      Seasonal allergies; nasal  congestion and cough.  Per 12/18/15, has history of reacting to metal surgery staples and certain types of jewelry; allergy to cheap metal (scanned record).     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     Other specified pre-operative examination 11/12/2002     Paroxysmal atrial fibrillation ([facility]) 1/19/2017     S/P total knee replacement using cement, 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     Stented coronary artery 2008     approximate date     Type 2 diabetes mellitus (H) 6/13/2014     Overview:  Diagnosis 5/14 No microalbuminuria; 6/13/14      Past Surgical History   Procedure Laterality Date     Laparoscopic cholecystectomy       Hysterectomy       partial     Back surgery       x2     Back surgery       back fusion     Knee surgery       knee replacement, right     Lumpectomy breast  2005     left     Neck surgery  2010     left plugged neck artery with stent placement     Orthopedic surgery Right      TKA     Low back fusion  2000     L1,2,3,4     Stent  2008     coronary     Arthroplasty knee Left 10/19/2015     Procedure: ARTHROPLASTY KNEE;  Surgeon: Jaren James MD;  Location: [facility] OR     Colonoscopy  5-     Family History   Problem Relation Age of Onset     CANCER Mother 74     unsure where it began; cause of death     Other - See Comments Father 69     myocardial infarction; cause of death     HEART DISEASE Sister      disease     HEART DISEASE Brother      disease     Social History     Social History     Marital status: Single     Spouse name: N/A     Number of children: N/A     Years of education: N/A     Occupational History     Not on file.     Social History Main  Topics     Smoking status: Current Some Day Smoker     Packs/day: 0.25     Years: 55.00     Types: Cigarettes     Smokeless tobacco: Never Used      Comment: tried to quit, no passive exposure, longest tobacco free: 2 months     Alcohol use No      Comment: quit 2002     Drug use: No     Sexual activity: No     Other Topics Concern     Blood Transfusions No     Caffeine Concern Yes     4 cups coffee daily     Social History Narrative         Review Of Systems  Constitutional, HEENT, cardiovascular, pulmonary, gi and gu systems are negative, except as otherwise noted.    OBJECTIVE:  Vitals: B/P: 124/72, T: Data Unavailable, P: 110, R: 20    Exam:  Physical Exam   Constitutional: She is oriented to person, place, and time. She appears well-developed and well-nourished. No distress.   Neurological: She is alert and oriented to person, place, and time.   Psychiatric: She has a normal mood and affect.     Other exam not repeated    Labs:  Admission on 01/18/2017, Discharged on 01/19/2017   Component Date Value Ref Range Status     WBC 01/18/2017 10.6  4.0 - 11.0 10e9/L Final     RBC Count 01/18/2017 5.37* 3.8 - 5.2 10e12/L Final     Hemoglobin 01/18/2017 16.0* 11.7 - 15.7 g/dL Final     Hematocrit 01/18/2017 46.1  35.0 - 47.0 % Final     MCV 01/18/2017 86  78 - 100 fl Final     MCH 01/18/2017 29.8  26.5 - 33.0 pg Final     MCHC 01/18/2017 34.7  31.5 - 36.5 g/dL Final     RDW 01/18/2017 13.2  10.0 - 15.0 % Final     Platelet Count 01/18/2017 205  150 - 450 10e9/L Final     Diff Method 01/18/2017 Automated Method   Final     % Neutrophils 01/18/2017 64.9  % Final     % Lymphocytes 01/18/2017 24.7  % Final     % Monocytes 01/18/2017 6.3  % Final     % Eosinophils 01/18/2017 2.8  % Final     % Basophils 01/18/2017 0.9  % Final     % Immature Granulocytes 01/18/2017 0.4  % Final     Nucleated RBCs 01/18/2017 0  0 /100 Final     Absolute Neutrophil 01/18/2017 6.9  1.6 - 8.3 10e9/L Final     Absolute Lymphocytes 01/18/2017 2.6   0.8 - 5.3 10e9/L Final     Absolute Monocytes 01/18/2017 0.7  0.0 - 1.3 10e9/L Final     Absolute Eosinophils 01/18/2017 0.3  0.0 - 0.7 10e9/L Final     Absolute Basophils 01/18/2017 0.1  0.0 - 0.2 10e9/L Final     Abs Immature Granulocytes 01/18/2017 0.0  0 - 0.4 10e9/L Final     Absolute Nucleated RBC 01/18/2017 0.0   Final     Troponin I ES 01/18/2017   0.000 - 0.045 ug/L Final                    Value:<0.015  The 99th percentile for upper reference range is 0.045 ug/L.  Troponin values in   the range of 0.045 - 0.120 ug/L may be associated with risks of adverse   clinical events.       Sodium 01/18/2017 138  133 - 144 mmol/L Final     Potassium 01/18/2017 4.2  3.4 - 5.3 mmol/L Final     Chloride 01/18/2017 100  94 - 109 mmol/L Final     Carbon Dioxide 01/18/2017 27  20 - 32 mmol/L Final     Anion Gap 01/18/2017 11  3 - 14 mmol/L Final     Glucose 01/18/2017 173* 70 - 99 mg/dL Final     Urea Nitrogen 01/18/2017 14  7 - 30 mg/dL Final     Creatinine 01/18/2017 0.66  0.52 - 1.04 mg/dL Final     GFR Estimate 01/18/2017 87  >60 mL/min/1.7m2 Final    Non  GFR Calc     GFR Estimate If Black 01/18/2017   >60 mL/min/1.7m2 Final                    Value:>90   GFR Calc       Calcium 01/18/2017 8.8  8.5 - 10.1 mg/dL Final     Bilirubin Total 01/18/2017 0.6  0.2 - 1.3 mg/dL Final     Albumin 01/18/2017 3.5  3.4 - 5.0 g/dL Final     Protein Total 01/18/2017 7.8  6.8 - 8.8 g/dL Final     Alkaline Phosphatase 01/18/2017 106  40 - 150 U/L Final     ALT 01/18/2017 57* 0 - 50 U/L Final     AST 01/18/2017 57* 0 - 45 U/L Final     Troponin I ES 01/19/2017 0.084* 0.000 - 0.045 ug/L Final    Comment: The 99th percentile for upper reference range is 0.045 ug/L.  Troponin values   in   the range of 0.045 - 0.120 ug/L may be associated with risks of adverse   clinical events.       Magnesium 01/18/2017 2.1  1.6 - 2.3 mg/dL Final     TSH 01/18/2017 2.22  0.40 - 4.00 mU/L Final     Hemoglobin A1C 01/18/2017  6.4* 4.3 - 6.0 % Final     Specimen Description 01/18/2017 Grzegorz   Final     Culture Micro 01/18/2017 No MRSA isolated   Final     Micro Report Status 01/18/2017 FINAL 01/20/2017   Final     Estimated Average Glucose 01/18/2017 137  mg/dL Final     WBC 01/19/2017 7.6  4.0 - 11.0 10e9/L Final     RBC Count 01/19/2017 4.56  3.8 - 5.2 10e12/L Final     Hemoglobin 01/19/2017 13.4  11.7 - 15.7 g/dL Final     Hematocrit 01/19/2017 40.0  35.0 - 47.0 % Final     MCV 01/19/2017 88  78 - 100 fl Final     MCH 01/19/2017 29.4  26.5 - 33.0 pg Final     MCHC 01/19/2017 33.5  31.5 - 36.5 g/dL Final     RDW 01/19/2017 13.2  10.0 - 15.0 % Final     Platelet Count 01/19/2017 166  150 - 450 10e9/L Final     Sodium 01/19/2017 143  133 - 144 mmol/L Final     Potassium 01/19/2017 4.2  3.4 - 5.3 mmol/L Final     Chloride 01/19/2017 105  94 - 109 mmol/L Final     Carbon Dioxide 01/19/2017 31  20 - 32 mmol/L Final     Anion Gap 01/19/2017 7  3 - 14 mmol/L Final     Glucose 01/19/2017 107* 70 - 99 mg/dL Final     Urea Nitrogen 01/19/2017 10  7 - 30 mg/dL Final     Creatinine 01/19/2017 0.68  0.52 - 1.04 mg/dL Final     GFR Estimate 01/19/2017 86  >60 mL/min/1.7m2 Final    Non  GFR Calc     GFR Estimate If Black 01/19/2017   >60 mL/min/1.7m2 Final                    Value:>90   GFR Calc       Calcium 01/19/2017 8.1* 8.5 - 10.1 mg/dL Final     Cholesterol 01/19/2017 166  <200 mg/dL Final     Triglycerides 01/19/2017 198* <150 mg/dL Final    Comment: Borderline high:  150-199 mg/dl   High:             200-499 mg/dl   Very high:       >499 mg/dl       HDL Cholesterol 01/19/2017 42* >49 mg/dL Final     LDL Cholesterol Calculated 01/19/2017 84  <100 mg/dL Final    Desirable:       <100 mg/dl     Non HDL Cholesterol 01/19/2017 124  <130 mg/dL Final     Troponin I ES 01/19/2017 0.056* 0.000 - 0.045 ug/L Final    Comment: The 99th percentile for upper reference range is 0.045 ug/L.  Troponin values   in   the range of  0.045 - 0.120 ug/L may be associated with risks of adverse   clinical events.         ASSESSMENT/PLAN:  Spinal stenosis of lumbar region with neurogenic claudication  Discussed management options.  Suspect secondary to lumbar spinal stenosis.  Lumbar epidural steroid injection and Pain Management consult scheduled.  Follow up with primary care provider  - IR Pain Management (HIB); Future  - IR Pain Management (HIB)            Darian Copeland MD

## 2017-03-22 NOTE — PROGRESS NOTES
"Pain Management Referral Consult    PATIENT HISTORICAL:    Patient Anatomy/region of concern:  Low back, LEFT side buttocks to front    When and how did your pain begin?   Date Mid February 2017  Event woke up one morning with it    Location of the pain? Low back, LEFT side buttocks to front    Describe what pain feels like: feels like someone kicked her, constantly there  Does it interfere with daily activities? YES    What makes your pain better? Laying down in certain position      Focal tenderness at one point along the length of a taut band (\"knotted muscle\")?  Yes feels like its in the cheek of her butt  Restricted range of motion of the involved muscle or joint?  Yes    Did you have a previous injection series where you found relief of at least 3 months?  Yes Lumbar MITCH w/ Dr. Gonzalez worker [facility] 8-11-15  Have you had surgery in the area of pain?   Yes  If yes, when was the surgery? 1998 L-Spine 2 fusions and hardware    What treatments have you already had for your pain?   Add length of time in weeks for all that apply.    Treatment Tried it--helped Tried it-- nohelp Made it worse   Pain clinic      Physical therapy      Chiropractic care      Spine injections  X     Other nerve blocks      Surgery X     Exercise   X   Others (list):              Injection Documentation of Provider History    PER-PROVIDER HISTORY, Dr. Copeland:  Is this for treatment of acute herpes zoster, post herpetic neuralgia, post-decompressive radiculitis, or post-surgical scarring?   No  Does the patient have radiculopathy or sciatica?  No  How long has the patient had any physical therapy, home therapy or chiropractor care?  0 weeks.  How long has the patient taken NSaids or muscle relaxants?  0 weeks. Takes 325mL as a daily  Is there any history of systemic/local infection or unstable medical conditions?  No    "

## 2017-03-28 NOTE — TELEPHONE ENCOUNTER
1:19 PM    Reason for Call: Phone Call    Description: Patient would like to discuss getting the injection in the back? Patient was seen by Dr Copeland on 3-17-17, but is a Dr Hammad Tian patient.    Was an appointment offered for this call? No    Preferred method for responding to this message: Telephone Call [phone]    If we cannot reach you directly, may we leave a detailed response at the number you provided? Yes    Can this message wait until your PCP/provider returns, if available today? YES    Tiff Magaña

## 2017-03-30 NOTE — PROGRESS NOTES
Fluoro time for this case was 30 seconds, less than 5 min  Was patient held? NO  If yes, by whom?

## 2017-03-30 NOTE — DISCHARGE INSTRUCTIONS
Home number on file [phone] (home)  Is it ok to leave a message if you are not home   yes    Dr Sanches completed your epidural steroid injection procedure on 3/30/2017.    Current Pain Level (0-10 Scale): 10/10  Post Pain Level (0-10):  4/10    Patient will be contacted by a diagnostic imaging nurse via telephone for follow up on pain levels in 2 weeks.    Radiology Discharge instructions for Steroid Injection    Activity Level:     Do not do any heavy activity or exercise for 24 hours.   Do not drive for 4 hours after your injection.  Diet:   Return to your normal diet.  Medications:   If you have stopped taking your Aspirin, Coumadin/Warfarin, Ibuprofen, or any   other blood thinner for this procedure you may resume in the morning unless   your primary care provider has given you other instructions.    Diabetics may see an increase in blood sugar after steroid injections. If you are concerned about your blood sugar, please contact your family doctor.    Site Care:  Remove the bandage and bathe or shower the morning after the procedure.      Call your Primary Care Provider if you have the following (if your primary care provider is not available please seek emergency care):   Nausea with vomiting   Severe headache   Drowsiness or confusion   Redness or drainage at the injection or puncture site   Temperature over 101 degrees F   Other concerns   Worsening back pain   Stiff neck

## 2017-03-30 NOTE — IP AVS SNAPSHOT
[facility]    [address] [place] [address]    Phone:  [phone]    Fax:  [phone]                                       After Visit Summary   3/30/2017    Hermelinda Murray    MRN: [id number]           After Visit Summary Signature Page     I have received my discharge instructions, and my questions have been answered. I have discussed any challenges I see with this plan with the nurse or doctor.    ..........................................................................................................................................  Patient/Patient Representative Signature      ..........................................................................................................................................  Patient Representative Print Name and Relationship to Patient    ..................................................               ................................................  Date                                            Time    ..........................................................................................................................................  Reviewed by Signature/Title    ...................................................              ..............................................  Date                                                            Time

## 2017-03-30 NOTE — IP AVS SNAPSHOT
MRN:[id number]                      After Visit Summary   3/30/2017    Hermelinda Murray    MRN: [id number]           Visit Information        Provider Department      3/30/2017  3:30 PM Radiologist, Need Interventional; [facility]           Review of your medicines      UNREVIEWED medicines. Ask your doctor about these medicines        Dose / Directions    acetaminophen 325 MG tablet   Commonly known as:  TYLENOL   Used for:  Degeneration of intervertebral disc of lumbosacral region        Dose:  325 mg   Take 1 tablet (325 mg) by mouth every 4 hours as needed for mild pain   Quantity:  100 tablet   Refills:  0       Acidophilus/Goat Milk Caps        Refills:  0       aspirin  MG EC tablet   Used for:  Coronary artery disease involving native coronary artery of native heart without angina pectoris        One daily   Quantity:  3 tablet   Refills:  0       betamethasone dipropionate 0.05 % ointment   Commonly known as:  DIPROSONE   Used for:  Psoriasis        Apply to elbow and cheek as needed for psoriasis flare ups   Quantity:  45 g   Refills:  0       HYDROcodone-acetaminophen 5-325 MG per tablet   Commonly known as:  NORCO   Used for:  Acute midline low back pain without sciatica        Dose:  1 tablet   Take 1 tablet by mouth every 6 hours as needed for moderate to severe pain Maximum 4 tablet(s) per day   Quantity:  60 tablet   Refills:  0       MULTIVITAL PO        Refills:  0       nitroglycerin 0.4 MG sublingual tablet   Commonly known as:  NITROSTAT   Used for:  Coronary artery disease involving native coronary artery of native heart without angina pectoris        1 tablet under the tongue every 5 minutes for chest discomfort   Quantity:  25 tablet   Refills:  3       Vitamin B-12 5000 MCG Subl        Twice a week per pt.   Refills:  0       VITAMIN C PO        Dose:  1000 mg   Take 1,000 mg by mouth daily   Refills:  0                Protect  "others around you: Learn how to safely use, store and throw away your medicines at www.disposemymeds.org.         Follow-ups after your visit         Care Instructions        Further instructions from your care team       Home number on file [phone] (home)  Is it ok to leave a message if you are not home   yes    Dr Sanches completed your epidural steroid injection procedure on 3/30/2017.    Current Pain Level (0-10 Scale): 10/10  Post Pain Level (0-10):  4/10    Patient will be contacted by a diagnostic imaging nurse via telephone for follow up on pain levels in 2 weeks.    Radiology Discharge instructions for Steroid Injection    Activity Level:     Do not do any heavy activity or exercise for 24 hours.   Do not drive for 4 hours after your injection.  Diet:   Return to your normal diet.  Medications:   If you have stopped taking your Aspirin, Coumadin/Warfarin, Ibuprofen, or any   other blood thinner for this procedure you may resume in the morning unless   your primary care provider has given you other instructions.    Diabetics may see an increase in blood sugar after steroid injections. If you are concerned about your blood sugar, please contact your family doctor.    Site Care:  Remove the bandage and bathe or shower the morning after the procedure.      Call your Primary Care Provider if you have the following (if your primary care provider is not available please seek emergency care):   Nausea with vomiting   Severe headache   Drowsiness or confusion   Redness or drainage at the injection or puncture site   Temperature over 101 degrees F   Other concerns   Worsening back pain   Stiff neck       Additional Information About Your Visit        [employer] Information     [employer] lets you send messages to your doctor, view your test results, renew your prescriptions, schedule appointments and more. To sign up, go to [email or web address] . Click on \"Log in\" on the left side of the screen, which will take " "you to the Welcome page. Then click on \"Sign up Now\" on the right side of the page.     You will be asked to enter the access code listed below, as well as some personal information. Please follow the directions to create your username and password.     Your access code is: [email or web address]  Expires: 2017  2:19 PM     Your access code will  in 90 days. If you need help or a new code, please call your [facility] clinic or [phone].        Care EveryWhere ID     This is your Care EveryWhere ID. This could be used by other organizations to access your [facility] medical records  [id number]         Primary Care Provider Office Phone # Fax #    R Hammad Tian [phone]      Thank you!     Thank you for choosing [facility] for your care. Our goal is always to provide you with excellent care. Hearing back from our patients is one way we can continue to improve our services. Please take a few minutes to complete the written survey that you may receive in the mail after you visit with us. Thank you!             Medication List: This is a list of all your medications and when to take them. Check marks below indicate your daily home schedule. Keep this list as a reference.      Medications           Morning Afternoon Evening Bedtime As Needed    acetaminophen 325 MG tablet   Commonly known as:  TYLENOL   Take 1 tablet (325 mg) by mouth every 4 hours as needed for mild pain                                Acidophilus/Goat Milk Caps                                aspirin  MG EC tablet   One daily                                betamethasone dipropionate 0.05 % ointment   Commonly known as:  DIPROSONE   Apply to elbow and cheek as needed for psoriasis flare ups                                HYDROcodone-acetaminophen 5-325 MG per tablet   Commonly known as:  NORCO   Take 1 tablet by mouth every 6 hours as needed for moderate to severe pain Maximum 4 tablet(s) per day                                " MULTIVITAL PO                                nitroglycerin 0.4 MG sublingual tablet   Commonly known as:  NITROSTAT   1 tablet under the tongue every 5 minutes for chest discomfort                                Vitamin B-12 5000 MCG Subl   Twice a week per pt.                                VITAMIN C PO   Take 1,000 mg by mouth daily

## 2017-04-03 NOTE — PROGRESS NOTES
I received a letter from her insurance stating Flexeril was not covered. I evaluated pt on 07 March 2017 for back pain. Pt has followed up with PCP, has Pain management consult and has had Fluro guided injections. Pt has also been placed on Norco for her pain.  At this point I do not feel the need to try a different med.  Denise Gentile Certified  Physician Assistant  4/3/2017  12:54 PM  [facility]

## 2017-04-03 NOTE — TELEPHONE ENCOUNTER
12:58 PM    Reason for Call: Phone Call    Description: Patient had a shot in her spine for pain .  She had it on 03/30/2017. It lasted only two days. Her pain level is high and is hoping she can get another shot soon.She state it isn't due for  Two weeks    Was an appointment offered for this call? No    Preferred method for responding to this message: Telephone Call    If we cannot reach you directly, may we leave a detailed response at the number you provided? Yes    Can this message wait until your PCP/provider returns, if available today? YES,     Genna Wade

## 2017-04-03 NOTE — TELEPHONE ENCOUNTER
Spoke with DI,  Notified patient she has to wait 2 weeks until next injection.  Order pending   NORBERTO FLAHERTY

## 2017-04-17 NOTE — IP AVS SNAPSHOT
MRN:[id number]                      After Visit Summary   4/17/2017    Hermelinda Murray    MRN: [id number]           Visit Information        Provider Department      4/17/2017 11:30 AM Radiologist, Need Interventional; [facility]iology           Review of your medicines      UNREVIEWED medicines. Ask your doctor about these medicines        Dose / Directions    acetaminophen 325 MG tablet   Commonly known as:  TYLENOL   Used for:  Degeneration of intervertebral disc of lumbosacral region        Dose:  325 mg   Take 1 tablet (325 mg) by mouth every 4 hours as needed for mild pain   Quantity:  100 tablet   Refills:  0       Acidophilus/Goat Milk Caps        Refills:  0       aspirin  MG EC tablet   Used for:  Coronary artery disease involving native coronary artery of native heart without angina pectoris        One daily   Quantity:  3 tablet   Refills:  0       betamethasone dipropionate 0.05 % ointment   Commonly known as:  DIPROSONE   Used for:  Psoriasis        Apply to elbow and cheek as needed for psoriasis flare ups   Quantity:  45 g   Refills:  0       HYDROcodone-acetaminophen 5-325 MG per tablet   Commonly known as:  NORCO   Used for:  Acute midline low back pain without sciatica        Dose:  1 tablet   Take 1 tablet by mouth every 6 hours as needed for moderate to severe pain Maximum 4 tablet(s) per day   Quantity:  60 tablet   Refills:  0       MULTIVITAL PO        Refills:  0       nitroglycerin 0.4 MG sublingual tablet   Commonly known as:  NITROSTAT   Used for:  Coronary artery disease involving native coronary artery of native heart without angina pectoris        1 tablet under the tongue every 5 minutes for chest discomfort   Quantity:  25 tablet   Refills:  3       Vitamin B-12 5000 MCG Subl        Twice a week per pt.   Refills:  0       VITAMIN C PO        Dose:  1000 mg   Take 1,000 mg by mouth daily   Refills:  0                Protect  "others around you: Learn how to safely use, store and throw away your medicines at www.disposemymeds.org.         Follow-ups after your visit         Care Instructions        Further instructions from your care team       Home number on file [phone] (home)  Is it ok to leave a message if you are not home yes    Dr Sanches completed your christopher lumbar procedure on 4/17/2017.    Current Pain Level (0-10 Scale): 8/10  Post Pain Level (0-10):  0/10    Patient will be contacted by a diagnostic imaging nurse via telephone for follow up on pain levels in 2 weeks.    Radiology Discharge instructions for Steroid Injection    Activity Level:     Do not do any heavy activity or exercise for 24 hours.   Do not drive for 4 hours after your injection.  Diet:   Return to your normal diet.  Medications:   If you have stopped taking your Aspirin, Coumadin/Warfarin, Ibuprofen, or any   other blood thinner for this procedure you may resume in the morning unless   your primary care provider has given you other instructions.    Diabetics may see an increase in blood sugar after steroid injections. If you are concerned about your blood sugar, please contact your family doctor.    Site Care:  Remove the bandage and bathe or shower the morning after the procedure.      Call your Primary Care Provider if you have the following (if your primary care provider is not available please seek emergency care):   Nausea with vomiting   Severe headache   Drowsiness or confusion   Redness or drainage at the injection or puncture site   Temperature over 101 degrees F   Other concerns   Worsening back pain   Stiff neck       Additional Information About Your Visit        [employer] Information     [employer] lets you send messages to your doctor, view your test results, renew your prescriptions, schedule appointments and more. To sign up, go to [email or web address] . Click on \"Log in\" on the left side of the screen, which will take you to the Welcome " "page. Then click on \"Sign up Now\" on the right side of the page.     You will be asked to enter the access code listed below, as well as some personal information. Please follow the directions to create your username and password.     Your access code is: [email or web address]  Expires: 2017  2:19 PM     Your access code will  in 90 days. If you need help or a new code, please call your [facility] clinic or [phone].        Care EveryWhere ID     This is your Care EveryWhere ID. This could be used by other organizations to access your [facility] medical records  [id number]         Primary Care Provider Office Phone # Fax #    R Hammad Tian [phone]      Thank you!     Thank you for choosing [facility] for your care. Our goal is always to provide you with excellent care. Hearing back from our patients is one way we can continue to improve our services. Please take a few minutes to complete the written survey that you may receive in the mail after you visit with us. Thank you!             Medication List: This is a list of all your medications and when to take them. Check marks below indicate your daily home schedule. Keep this list as a reference.      Medications           Morning Afternoon Evening Bedtime As Needed    acetaminophen 325 MG tablet   Commonly known as:  TYLENOL   Take 1 tablet (325 mg) by mouth every 4 hours as needed for mild pain                                Acidophilus/Goat Milk Caps                                aspirin  MG EC tablet   One daily                                betamethasone dipropionate 0.05 % ointment   Commonly known as:  DIPROSONE   Apply to elbow and cheek as needed for psoriasis flare ups                                HYDROcodone-acetaminophen 5-325 MG per tablet   Commonly known as:  NORCO   Take 1 tablet by mouth every 6 hours as needed for moderate to severe pain Maximum 4 tablet(s) per day                                MULTIVITAL PO     "                            nitroglycerin 0.4 MG sublingual tablet   Commonly known as:  NITROSTAT   1 tablet under the tongue every 5 minutes for chest discomfort                                Vitamin B-12 5000 MCG Subl   Twice a week per pt.                                VITAMIN C PO   Take 1,000 mg by mouth daily

## 2017-04-17 NOTE — DISCHARGE INSTRUCTIONS
Home number on file [phone] (home)  Is it ok to leave a message if you are not home yes    Dr Sanches completed your christopher lumbar procedure on 4/17/2017.    Current Pain Level (0-10 Scale): 8/10  Post Pain Level (0-10):  0/10    Patient will be contacted by a diagnostic imaging nurse via telephone for follow up on pain levels in 2 weeks.    Radiology Discharge instructions for Steroid Injection    Activity Level:     Do not do any heavy activity or exercise for 24 hours.   Do not drive for 4 hours after your injection.  Diet:   Return to your normal diet.  Medications:   If you have stopped taking your Aspirin, Coumadin/Warfarin, Ibuprofen, or any   other blood thinner for this procedure you may resume in the morning unless   your primary care provider has given you other instructions.    Diabetics may see an increase in blood sugar after steroid injections. If you are concerned about your blood sugar, please contact your family doctor.    Site Care:  Remove the bandage and bathe or shower the morning after the procedure.      Call your Primary Care Provider if you have the following (if your primary care provider is not available please seek emergency care):   Nausea with vomiting   Severe headache   Drowsiness or confusion   Redness or drainage at the injection or puncture site   Temperature over 101 degrees F   Other concerns   Worsening back pain   Stiff neck

## 2017-04-17 NOTE — IP AVS SNAPSHOT
[facility]    [address] [place] [id number]    Phone:  [phone]    Fax:  [phone]                                       After Visit Summary   4/17/2017    Hermelinda Murray    MRN: [id number]           After Visit Summary Signature Page     I have received my discharge instructions, and my questions have been answered. I have discussed any challenges I see with this plan with the nurse or doctor.    ..........................................................................................................................................  Patient/Patient Representative Signature      ..........................................................................................................................................  Patient Representative Print Name and Relationship to Patient    ..................................................               ................................................  Date                                            Time    ..........................................................................................................................................  Reviewed by Signature/Title    ...................................................              ..............................................  Date                                                            Time

## 2017-09-08 NOTE — ED NOTES
Presents to triage for c/o abd pain during process pt finally tells me she had chest pain yesterday taking nitro.  Brought back to room 2.  In gown, call light in reach.

## 2017-09-08 NOTE — ED PROVIDER NOTES
History     Chief Complaint   Patient presents with     Abdominal Pain     Constipation     Leg Swelling     Chest Pain     HPI  Hermelinda Murray is a 71 year old female who presents with multiple complaints including abdominal pain, constipation (treated with dulcolax and had stools after that yesterday), leg swelling and chest pain.  The chest pain occurred yesterday, is not present on arrival in the ED.  She has ongoing problems with her back, is currently pain free with regard to her chest, abdomen has minimal tenderness.  Daughter states she has cirrhosis, but there is no record of this in her chart.  Her dyspnea is limiting activity, she states she has to rest frequently.  .    I have reviewed the Medications, Allergies, Past Medical and Surgical History, and Social History in the Epic system.    Allergies:   Allergies   Allergen Reactions     Metal [Staples]      States cheap metal     Seasonal Allergies      Seasonal allergies; nasal congestion and cough.  Per 12/18/15, has history of reacting to metal surgery staples and certain types of jewelry; allergy to cheap metal (scanned record).     Zinc          No current facility-administered medications on file prior to encounter.   Current Outpatient Prescriptions on File Prior to Encounter:  HYDROcodone-acetaminophen (NORCO) 5-325 MG per tablet Take 1 tablet by mouth every 6 hours as needed for moderate to severe pain Maximum 4 tablet(s) per day   aspirin 325 MG EC tablet One daily   acetaminophen (TYLENOL) 325 MG tablet Take 1 tablet (325 mg) by mouth every 4 hours as needed for mild pain   nitroglycerin (NITROSTAT) 0.4 MG sublingual tablet 1 tablet under the tongue every 5 minutes for chest discomfort   betamethasone dipropionate (DIPROSONE) 0.05 % ointment Apply to elbow and cheek as needed for psoriasis flare ups   Multiple Vitamins-Minerals (MULTIVITAL PO)    Cyanocobalamin (VITAMIN B-12) 5000 MCG SUBL Twice a week per pt.   Probiotic Product  (ACIDOPHILUS/GOAT MILK) CAPS    Ascorbic Acid (VITAMIN C PO) Take 1,000 mg by mouth daily       Patient Active Problem List   Diagnosis     S/P total knee replacement using cement, left     Dermatitis     History of atrial flutter     Atherosclerosis of abdominal aorta (H)     Left upper quadrant pain     Malignant neoplasm of breast (H)     Mass of breast     Stenosis of carotid artery     Chronic pain syndrome     Constipation     Degeneration of intervertebral disc of lumbosacral region     Type 2 diabetes mellitus (H)     Dupuytren's disease of palm     Elevated erythrocyte sedimentation rate     Advance directive discussed with patient     Hypoxia     Morbid obesity (H)     Nonalcoholic steatohepatitis (GILLESPIE)     Osteoarthritis     Osteopenia     Hyperlipidemia     Psoriasis     Disorder of rotator cuff     Thrombocythemia (H)     Tobacco dependence syndrome     Elevation of level of transaminase or lactic acid dehydrogenase (LDH)     Atrial fibrillation (H)     Paroxysmal atrial fibrillation (H)       Past Surgical History:   Procedure Laterality Date     ARTHROPLASTY KNEE Left 10/19/2015    Procedure: ARTHROPLASTY KNEE;  Surgeon: Jaren James MD;  Location: [facility]     BACK SURGERY      x2     BACK SURGERY      back fusion     COLONOSCOPY  5-     HYSTERECTOMY      partial     KNEE SURGERY      knee replacement, right     LAPAROSCOPIC CHOLECYSTECTOMY       low back fusion  2000    L1,2,3,4     LUMPECTOMY BREAST  2005    left     NECK SURGERY  2010    left plugged neck artery with stent placement     ORTHOPEDIC SURGERY Right     TKA     STENT  2008    coronary       Social History   Substance Use Topics     Smoking status: Current Some Day Smoker     Packs/day: 0.25     Years: 55.00     Types: Cigarettes     Smokeless tobacco: Never Used      Comment: tried to quit, no passive exposure, longest tobacco free: 2 months     Alcohol use No      Comment: quit 2002         There is no immunization history  "on file for this patient.    BMI: Estimated body mass index is 41.6 kg/(m^2) as calculated from the following:    Height as of 3/14/17: 1.651 m (5' 5\").    Weight as of this encounter: 113.4 kg (250 lb).      Review of Systems   Constitutional: Positive for activity change and fatigue. Negative for diaphoresis.   HENT: Negative.    Respiratory: Positive for shortness of breath. Negative for cough and wheezing.    Cardiovascular: Negative for chest pain.        See HPI re: recent.   Gastrointestinal: Positive for abdominal pain, constipation and nausea. Negative for diarrhea and vomiting.   Genitourinary: Negative for dysuria.   Musculoskeletal: Positive for back pain.        Chronic   Skin: Positive for pallor.   Neurological: Negative.    Psychiatric/Behavioral: Negative.        Physical Exam   BP: 147/94  Pulse: 110  Temp: 98  F (36.7  C)  Resp: (!) 32  Weight: 113.4 kg (250 lb)  SpO2: 93 %  Physical Exam   Constitutional: She is oriented to person, place, and time. She appears well-developed and well-nourished. No distress.   HENT:   Head: Normocephalic and atraumatic.   Neck: Normal range of motion. Neck supple.   Cardiovascular: Normal rate, regular rhythm, normal heart sounds and intact distal pulses.    No murmur heard.  Pulmonary/Chest: Effort normal and breath sounds normal. No respiratory distress.   Abdominal: Soft. Bowel sounds are normal. She exhibits no distension. There is no tenderness.   Musculoskeletal: Normal range of motion.   Neurological: She is alert and oriented to person, place, and time.   Skin: Skin is warm and dry.   Psychiatric: She has a normal mood and affect.   Nursing note and vitals reviewed.      ED Course     ED Course     Procedures             EKG Interpretation:      Interpreted by Radha Soriano  Time reviewed: 1000  Symptoms at time of EKG: dyspnea   Rhythm: normal sinus   Rate: normal  Axis: normal  Ectopy: none  Conduction: normal  ST Segments/ T Waves: No ST-T " wave changes  Q Waves: none  Comparison to prior: Unchanged    Clinical Impression: normal EKG    Labs Ordered and Resulted from Time of ED Arrival Up to the Time of Departure from the ED   CBC WITH PLATELETS DIFFERENTIAL - Abnormal; Notable for the following:        Result Value    RBC Count 5.33 (*)     Hemoglobin 16.3 (*)     All other components within normal limits   COMPREHENSIVE METABOLIC PANEL - Abnormal; Notable for the following:     Glucose 136 (*)     ALT 56 (*)     AST 54 (*)     All other components within normal limits   LACTIC ACID   TROPONIN I   NT PROBNP INPATIENT   PERIPHERAL IV CATHETER   CARDIAC CONTINUOUS MONITORING   PULSE OXIMETRY NURSING   PULSE OXIMETRY NURSING   CARDIAC CONTINUOUS MONITORING   PERIPHERAL IV CATHETER       Assessments & Plan (with Medical Decision Making)   Patient has no abdominal pathology excepting a mass on the remnant of the left ovary and there is a 2cm mass in the LLL suspicious for malignancy.  After some confusion, the patient recalled she had her uterus and a single ovary removed with her partial hysterectomy, and she believed she had an appendectomy, but it is clearly visible on CT per Dr. Sanches.  She will follow up with Dr. Tian next week for further evaluation of her lung and potentially the ovary as well.  Miralax recommended for bowels.    I have reviewed the nursing notes.    I have reviewed the findings, diagnosis, plan and need for follow up with the patient.       Discharge Medication List as of 9/8/2017  2:11 PM          Final diagnoses:   Pulmonary nodules   Slow transit constipation       9/8/2017   [facility]     Radha Bedoya MD  09/08/17 2712

## 2017-09-08 NOTE — DISCHARGE INSTRUCTIONS
Common Questions about Lung Nodules  What is a lung nodule?  A nodule is a small spot in the lung that is more solid than normal tissue. It is seen with a chest X-ray or a CT scan. The cause of nodules may be scar tissue, an infection or some irritant in the air. Sometimes, a nodule is an early lung cancer.  What is the chance that the nodule is an early lung cancer?  Most small nodules are not cancer. Fewer than 5 out of 100 people with nodules turn out to have cancer. The risk is greater for patients who:    Are olderw    Have a nodule of 9 mm or larger    Have smoked or still smoke cigarettes    Have added risks, such as a family history of lung cancer or working with asbestos.  If you would like to know more about your risk for lung cancer, please talk to your provider.  What size is a small lung nodule?  A small nodule is less than 9 mm across: 6, 7 or 8 mm. Picture a pea--it's 5mm and a cherry is 10 mm.     What will happen next?    Your care team will probably recommend more CT scans to watch the nodule for changes.    A nodule that is not cancer usually doesn't grow. Generally, if the nodule doesn't grow for 2 years, it is safe to stop the scans. But certain types of nodules may need a longer follow-up.    If the nodule is getting bigger, we will use other studies or do a biopsy to get a closer look.  How do you know how to time the next scan?  We decide when to do a scan based on the size of the nodule and your risk for cancer.   You can help decide when to get the next CT scan. Call your healthcare team if you have questions or concerns about the date.  Is it safe to wait for the next scan?  Most cancers grow fairly slowly. Waiting a few months for the next scan is thought to be very safe.   Why shouldn't I get a biopsy now?    Biopsies are safer for nodules that are 9 mm or larger.    During a biopsy, the doctor removes a small piece of your lung and looks at it under a microscope. It is difficult to  biopsy small nodules safely. It could cause breathing problems, bleeding or infection.  What if I'm still smoking?  We are here to help you quit! Quitting now will lower your chance of getting lung cancer, and other serious health problems like emphysema and heart disease. For help quitting: www.quitplan.vLine or call 1-956.475.2079 for one-on-one coaching from counselors. [place] adults may also receive free patches, gum or lozenges.  What if my nodule is lung cancer?  If a nodule turns out to be lung cancer, it is likely to be in an early stage. If treated at an early stage, you are more likely to survive the cancer.   Please talk with your health care team if you have any concerns about lung cancer. Remember:    Most small nodules are not lung cancer.    Biopsies of small nodules can cause more harm than good.    If you are still smoking, the best way to improve your health is to quit.    It is normal to be worried when there is even a small chance of lung cancer.  When should I call for help?  Call your care team if you:    Have a new or worse cough, or cough up blood    Have shortness of breath, chest pain, fevers or chills    Lose 10 pounds or more without trying to lose weight    Feel worried and anxious  Resources  [place] National Library of Medicine:   https://medlineplus.gov/ency/article/228229.htm   CDC Tobacco Resources  www.cdc.gov/tobacco/campaign/tips/quit-smoking  www.Smokefree.gov  For informational purposes only. Not to replace the advice of your health care provider. Copyright  2016 [facility]. All rights reserved. TOMODO 515506 - Rev 11/16.  What to expect when you have contrast    During your exam, we will inject  contrast  into your vein or artery. (Contrast is a clear liquid with iodine in it. It shows up on X-rays.)    You may feel warm or hot. You may have a metal taste in your mouth and a slight upset stomach. You may also feel pressure near the kidneys and bladder. These  effects will last about 1 to 3 minutes.    Please tell us if you have:    Sneezing     Itching    Hives     Swelling in the face    A hoarse voice    Breathing problems    Other new symptoms    Serious problems are rare.  They may include:    Irregular heartbeat     Seizures    Kidney failure              Tissue damage    Shock      Death    If you have any problems during the exam, we  will treat them right away.    When you get home    Call your hospital if you have any new symptoms in the next 2 days, like hives or swelling. (Phone numbers are at the bottom of this page.) Or call your family doctor.     If you have wheezing or trouble breathing, call 911.    Self-care  -Drink at least 4 extra glasses of water today.   This reduces the stress on your kidneys.  -Keep taking your regular medicines.    The contrast will pass out of your body in your  Urine(pee). This will happen in the next 24 hours. You  will not feel this. Your urine will not  change color.    If you have kidney problems or take metformin    Drink 4 to 8 large glasses of water for the next  2 days, if you are not on a fluid restriction.    ?If you take metformin (Glucophage or Glucovance) for diabetes, keep taking it.      ?Your kidney function tests are abnormal.  If you take Metformin, do not take it for 48 hours. Please go to your clinic for a blood test within 3 days after your exam before the restarting this medicine.     (Note to provider:please give patient prescription for lab tests.)    ?Special instructions: Drink at least 4 extra glasses of water today.   This reduces the stress on your kidneys.    I have read and understand the above information.    Patient Sign Here:______________________________________Date:________Time:______    Staff Sign Here:________________________________________Date:_______Time:______      Radiology Departments:     ?Jaycob Tran: [phone] ?[facility]: [phone]     ?Vicki Trinidad:  [phone] ?[facility]:[phone]      ?[place]: [phone]  ?[place]: [phone]  ?[place]:[phone]    ?[facility] [place]:[phone]  ?[facility]:[phone]

## 2017-09-08 NOTE — ED AVS SNAPSHOT
[facility]    [address] [place] [id number]    Phone:  [phone]                                       Hermelinda Murray   MRN: [id number]    Department:  [facility]   Date of Visit:  9/8/2017           Patient Information     Date Of Birth          1945        Your diagnoses for this visit were:     Pulmonary nodules     Slow transit constipation        You were seen by Radha Bedoya MD.      Follow-up Information     Follow up with BI Tian MD In 3 days.    Specialty:  Family Practice    Why:  Follow up ED visit    Contact information:    [facility]  [address] [place] [address] [phone]          Discharge Instructions         Common Questions about Lung Nodules  What is a lung nodule?  A nodule is a small spot in the lung that is more solid than normal tissue. It is seen with a chest X-ray or a CT scan. The cause of nodules may be scar tissue, an infection or some irritant in the air. Sometimes, a nodule is an early lung cancer.  What is the chance that the nodule is an early lung cancer?  Most small nodules are not cancer. Fewer than 5 out of 100 people with nodules turn out to have cancer. The risk is greater for patients who:    Are olderw    Have a nodule of 9 mm or larger    Have smoked or still smoke cigarettes    Have added risks, such as a family history of lung cancer or working with asbestos.  If you would like to know more about your risk for lung cancer, please talk to your provider.  What size is a small lung nodule?  A small nodule is less than 9 mm across: 6, 7 or 8 mm. Picture a pea--it's 5mm and a cherry is 10 mm.     What will happen next?    Your care team will probably recommend more CT scans to watch the nodule for changes.    A nodule that is not cancer usually doesn't grow. Generally, if the nodule doesn't grow for 2 years, it is safe to stop the scans. But certain types of nodules may need a  longer follow-up.    If the nodule is getting bigger, we will use other studies or do a biopsy to get a closer look.  How do you know how to time the next scan?  We decide when to do a scan based on the size of the nodule and your risk for cancer.   You can help decide when to get the next CT scan. Call your healthcare team if you have questions or concerns about the date.  Is it safe to wait for the next scan?  Most cancers grow fairly slowly. Waiting a few months for the next scan is thought to be very safe.   Why shouldn't I get a biopsy now?    Biopsies are safer for nodules that are 9 mm or larger.    During a biopsy, the doctor removes a small piece of your lung and looks at it under a microscope. It is difficult to biopsy small nodules safely. It could cause breathing problems, bleeding or infection.  What if I'm still smoking?  We are here to help you quit! Quitting now will lower your chance of getting lung cancer, and other serious health problems like emphysema and heart disease. For help quitting: www.Appuri.M-DISC or call 1-842.446.2844 for one-on-one coaching from counselors. [place] adults may also receive free patches, gum or lozenges.  What if my nodule is lung cancer?  If a nodule turns out to be lung cancer, it is likely to be in an early stage. If treated at an early stage, you are more likely to survive the cancer.   Please talk with your health care team if you have any concerns about lung cancer. Remember:    Most small nodules are not lung cancer.    Biopsies of small nodules can cause more harm than good.    If you are still smoking, the best way to improve your health is to quit.    It is normal to be worried when there is even a small chance of lung cancer.  When should I call for help?  Call your care team if you:    Have a new or worse cough, or cough up blood    Have shortness of breath, chest pain, fevers or chills    Lose 10 pounds or more without trying to lose weight    Feel  worried and anxious  Resources  US National Library of Medicine:   https://medlineplus.gov/ency/article/950342.htm   CDC Tobacco Resources  www.cdc.gov/tobacco/campaign/tips/quit-smoking  www.Smokefree.gov  For informational purposes only. Not to replace the advice of your health care provider. Copyright  2016 [facility]. All rights reserved. [employer] 713944 - Rev 11/16.  What to expect when you have contrast    During your exam, we will inject  contrast  into your vein or artery. (Contrast is a clear liquid with iodine in it. It shows up on X-rays.)    You may feel warm or hot. You may have a metal taste in your mouth and a slight upset stomach. You may also feel pressure near the kidneys and bladder. These effects will last about 1 to 3 minutes.    Please tell us if you have:    Sneezing     Itching    Hives     Swelling in the face    A hoarse voice    Breathing problems    Other new symptoms    Serious problems are rare.  They may include:    Irregular heartbeat     Seizures    Kidney failure              Tissue damage    Shock      Death    If you have any problems during the exam, we  will treat them right away.    When you get home    Call your hospital if you have any new symptoms in the next 2 days, like hives or swelling. (Phone numbers are at the bottom of this page.) Or call your family doctor.     If you have wheezing or trouble breathing, call 911.    Self-care  -Drink at least 4 extra glasses of water today.   This reduces the stress on your kidneys.  -Keep taking your regular medicines.    The contrast will pass out of your body in your  Urine(pee). This will happen in the next 24 hours. You  will not feel this. Your urine will not  change color.    If you have kidney problems or take metformin    Drink 4 to 8 large glasses of water for the next  2 days, if you are not on a fluid restriction.    ?If you take metformin (Glucophage or Glucovance) for diabetes, keep taking it.      ?Your  kidney function tests are abnormal.  If you take Metformin, do not take it for 48 hours. Please go to your clinic for a blood test within 3 days after your exam before the restarting this medicine.     (Note to provider:please give patient prescription for lab tests.)    ?Special instructions: Drink at least 4 extra glasses of water today.   This reduces the stress on your kidneys.    I have read and understand the above information.    Patient Sign Here:______________________________________Date:________Time:______    Staff Sign Here:________________________________________Date:_______Time:______      Radiology Departments:     ?[facility]: [phone] ?[facility]: [phone]     ?[facility]: [phone] ?[facility]:[phone]      ?[facility]: [phone]  ?[facility]: [phone]  ?[facility]:[phone]    ?[facility]:[phone]  ?[facility]:[phone]       Review of your medicines      Our records show that you are taking the medicines listed below. If these are incorrect, please call your family doctor or clinic.        Dose / Directions Last dose taken    acetaminophen 325 MG tablet   Commonly known as:  TYLENOL   Dose:  325 mg   Quantity:  100 tablet        Take 1 tablet (325 mg) by mouth every 4 hours as needed for mild pain   Refills:  0        Acidophilus/Goat Milk Caps        Refills:  0        aspirin  MG EC tablet   Quantity:  3 tablet        One daily   Refills:  0        betamethasone dipropionate 0.05 % ointment   Commonly known as:  DIPROSONE   Quantity:  45 g        Apply to elbow and cheek as needed for psoriasis flare ups   Refills:  0        HYDROcodone-acetaminophen 5-325 MG per tablet   Commonly known as:  NORCO   Dose:  1 tablet   Quantity:  60 tablet        Take 1 tablet by mouth every 6 hours as needed for moderate to severe pain Maximum 4 tablet(s) per day   Refills:  0        MULTIVITAL PO        Refills:  0        nitroGLYcerin 0.4 MG sublingual tablet   Commonly known  "as:  NITROSTAT   Quantity:  25 tablet        1 tablet under the tongue every 5 minutes for chest discomfort   Refills:  3        Vitamin B-12 5000 MCG Subl        Twice a week per pt.   Refills:  0        VITAMIN C PO   Dose:  1000 mg        Take 1,000 mg by mouth daily   Refills:  0                Procedures and tests performed during your visit     Procedure/Test Number of Times Performed    CBC with platelets differential 1    CT Abdomen Pelvis w Contrast 1    Cardiac Continuous Monitoring 2    Chest XR,  PA & LAT 1    Comprehensive metabolic panel 1    EKG 12-lead, tracing only 2    Lactic acid 1    Nt probnp inpatient 1    Peripheral IV: Standard 2    Pulse oximetry nursing 2    Troponin I 1      Orders Needing Specimen Collection     None      Pending Results     Date and Time Order Name Status Description    9/8/2017 1109 CT Abdomen Pelvis w Contrast Preliminary     9/8/2017 1109 Chest XR,  PA & LAT Preliminary             Pending Culture Results     No orders found from 9/6/2017 to 9/9/2017.            Thank you for choosing [facility]       Thank you for choosing [facility] for your care. Our goal is always to provide you with excellent care. Hearing back from our patients is one way we can continue to improve our services. Please take a few minutes to complete the written survey that you may receive in the mail after you visit with us. Thank you!        [employer] Information     [employer] lets you send messages to your doctor, view your test results, renew your prescriptions, schedule appointments and more. To sign up, go to [email or web address] . Click on \"Log in\" on the left side of the screen, which will take you to the Welcome page. Then click on \"Sign up Now\" on the right side of the page.     You will be asked to enter the access code listed below, as well as some personal information. Please follow the directions to create your username and password.     Your access code is: [email or web address]  Expires: " 2017  2:08 PM     Your access code will  in 90 days. If you need help or a new code, please call your [facility] clinic or [phone].        Care EveryWhere ID     This is your Care EveryWhere ID. This could be used by other organizations to access your [facility] medical records  [id number]        Equal Access to Services     ALEN TYSON : Marti Avilez, watania sánchez, diomedes wetzelalcarissa frank, jarek morrison . So [facility] [phone].    ATENCIÓN: Si habla español, tiene a gomez disposición servicios gratuitos de asistencia lingüística. Llame al [phone].    We comply with applicable federal civil rights laws and [place] laws. We do not discriminate on the basis of race, color, national origin, age, disability sex, sexual orientation or gender identity.            After Visit Summary       This is your record. Keep this with you and show to your community pharmacist(s) and doctor(s) at your next visit.

## 2017-09-08 NOTE — ED NOTES
Patient is back from radiology; pt is requesting to have bed turned so she can see the tv better; pt complaining of the BP cuff being too tight

## 2017-09-08 NOTE — PROGRESS NOTES
This is a 71 year old woman who is going to follow up with Dr SHIRLEY Tian.  Her Eye doctor is Dr Polanco, and her dentist is Dr Bales.  She does have a health care directive.  Her pharmacy is [facility].  She does have a medica msho does not know who her care coordinator is.  She does get home supports for laundry housekeeping, and the like.  She is I in her adl's, uses a cane, and has a handicap accessible bathroom with a raised toilet seat.  She drives, has a handicap sticker, and does all of her own grocery shopping.  Her myrna is very important to her.  Her support system is her daughter af multiple family members.  She likes to do crafts in her free time.  She ha concerns regarding her oxygen and her home environment.  Encouraged her to talk with Dr Tian in her next meeting.

## 2017-09-08 NOTE — ED AVS SNAPSHOT
[facility]    [address] [address]    Phone:  [phone]                                       Hermelinda Murray   MRN: [id number]    Department[facility]  [facility]   Date of Visit:  9/8/2017           After Visit Summary Signature Page     I have received my discharge instructions, and my questions have been answered. I have discussed any challenges I see with this plan with the nurse or doctor.    ..........................................................................................................................................  Patient/Patient Representative Signature      ..........................................................................................................................................  Patient Representative Print Name and Relationship to Patient    ..................................................               ................................................  Date                                            Time    ..........................................................................................................................................  Reviewed by Signature/Title    ...................................................              ..............................................  Date                                                            Time

## 2017-09-12 PROBLEM — R91.8 ABNORMAL CT SCAN, LUNG: Status: ACTIVE | Noted: 2017-01-01

## 2017-09-12 NOTE — PROGRESS NOTES
SUBJECTIVE:   Hermelinda Murray is a 71 year old female who presents to clinic today for the following health issues:        ED/UC Followup:    Facility:  [facility]   Date of visit: 9/8/17  Reason for visit:   Abdominal Pain      Constipation     Leg Swelling     Chest Pain       Current Status: patient stats improved since last visit          In the ER had an abdominal CT that showed a solitary pulmonary nodule that needs further evaluation.  She also needs a letter stating that she needs an easier access from her car to her apartment.    Problem list and histories reviewed & adjusted, as indicated.  Additional history: as documented    Patient Active Problem List   Diagnosis     S/P total knee replacement using cement, left     Dermatitis     History of atrial flutter     Atherosclerosis of abdominal aorta (H)     Left upper quadrant pain     Malignant neoplasm of breast (H)     Mass of breast     Stenosis of carotid artery     Chronic pain syndrome     Constipation     Degeneration of intervertebral disc of lumbosacral region     Type 2 diabetes mellitus (H)     Dupuytren's disease of palm     Elevated erythrocyte sedimentation rate     Advance directive discussed with patient     Hypoxia     Morbid obesity (H)     Nonalcoholic steatohepatitis (GILLESPIE)     Osteoarthritis     Osteopenia     Hyperlipidemia     Psoriasis     Disorder of rotator cuff     Thrombocythemia (H)     Tobacco dependence syndrome     Elevation of level of transaminase or lactic acid dehydrogenase (LDH)     Atrial fibrillation (H)     Paroxysmal atrial fibrillation (H)     Abnormal CT scan, lung     Past Surgical History:   Procedure Laterality Date     ARTHROPLASTY KNEE Left 10/19/2015    Procedure: ARTHROPLASTY KNEE;  Surgeon: Jaren James MD;  Location: [facility]     BACK SURGERY      x2     BACK SURGERY      back fusion     COLONOSCOPY  5-     HYSTERECTOMY      partial     KNEE SURGERY      knee replacement, right     LAPAROSCOPIC  CHOLECYSTECTOMY       low back fusion  2000    L1,2,3,4     LUMPECTOMY BREAST  2005    left     NECK SURGERY  2010    left plugged neck artery with stent placement     ORTHOPEDIC SURGERY Right     TKA     STENT  2008    coronary       Social History   Substance Use Topics     Smoking status: Current Some Day Smoker     Packs/day: 0.25     Years: 55.00     Types: Cigarettes     Smokeless tobacco: Never Used      Comment: tried to quit, no passive exposure, longest tobacco free: 2 months     Alcohol use No      Comment: quit 2002     Family History   Problem Relation Age of Onset     CANCER Mother 74     unsure where it began; cause of death     Other - See Comments Father 69     myocardial infarction; cause of death     HEART DISEASE Sister      disease     HEART DISEASE Brother      disease         Current Outpatient Prescriptions   Medication Sig Dispense Refill     nitroGLYcerin (NITROSTAT) 0.4 MG sublingual tablet 1 tablet under the tongue every 5 minutes for chest discomfort 25 tablet 3     HYDROcodone-acetaminophen (NORCO) 5-325 MG per tablet Take 1 tablet by mouth every 6 hours as needed for moderate to severe pain Maximum 4 tablet(s) per day 60 tablet 0     polyethylene glycol (MIRALAX) powder Take 17 g (1 capful) by mouth daily 527 g 0     aspirin 325 MG EC tablet One daily 3 tablet 0     acetaminophen (TYLENOL) 325 MG tablet Take 1 tablet (325 mg) by mouth every 4 hours as needed for mild pain 100 tablet      betamethasone dipropionate (DIPROSONE) 0.05 % ointment Apply to elbow and cheek as needed for psoriasis flare ups 45 g 0     Multiple Vitamins-Minerals (MULTIVITAL PO)        Probiotic Product (ACIDOPHILUS/GOAT MILK) CAPS        Ascorbic Acid (VITAMIN C PO) Take 1,000 mg by mouth daily       [DISCONTINUED] nitroglycerin (NITROSTAT) 0.4 MG sublingual tablet 1 tablet under the tongue every 5 minutes for chest discomfort 25 tablet 3     Allergies   Allergen Reactions     Metal [Staples]      States cheap  metal     Seasonal Allergies      Seasonal allergies; nasal congestion and cough.  Per 12/18/15, has history of reacting to metal surgery staples and certain types of jewelry; allergy to cheap metal (scanned record).     Zinc          Reviewed and updated as needed this visit by clinical staffAllergies       Reviewed and updated as needed this visit by Provider         ROS:  Constitutional, HEENT, cardiovascular, pulmonary, GI, , musculoskeletal, neuro, skin, endocrine and psych systems are negative, except as otherwise noted.      OBJECTIVE:   /78 (BP Location: Left arm, Patient Position: Chair, Cuff Size: Adult Large)  Pulse 105  Temp 98.1  F (36.7  C) (Tympanic)  SpO2 95%  Breastfeeding? No  There is no height or weight on file to calculate BMI.  GENERAL: healthy, alert and no distress  NECK: no adenopathy, no asymmetry, masses, or scars and thyroid normal to palpation  RESP:prolonged expiratory phase  CV: regular rate and rhythm, normal S1 S2, no S3 or S4, no murmur, click or rub, no peripheral edema and peripheral pulses strong  ABDOMEN: soft, nontender, no hepatosplenomegaly, no masses and bowel sounds normal  MS: midline lumbar tenderness, uses a cane    Diagnostic Test Results:  Exam Date Exam Time Exam Date Exam Time Accession # Results    9/8/17 11:59 AM 9/8/17 12:08 PM [id number]    [facility] Interactive Report and InfoRx   View the interactive report   PACS Images   Show images for CT Abdomen Pelvis w Contrast   Study Result         ABDOMEN AND PELVIS CT     FINDINGS:  There is a 2 cm in diameter nodule seen in the left lower  lobe.  This was not seen in 2012 and is suspicious for malignancy.  There is enlargement of the left lobe of the liver.  This lobe has  enlarged compared to 2012.  The spleen and pancreas appear normal.  There are no adrenal masses.  There is a benign cyst seen in the right  kidney.  There is some ectasia in the abdominal aorta.  There is a 2  to 3 cm in  diameter low-density mass in the left ovary, along the  pelvic sidewall.  This low-density mass was not present in 2012.  The  bladder and rectum appear normal.  The patient has undergone  hysterectomy.  On sagittal and coronal reconstructions, no  intraperitoneal abnormalities are noted.  The appendix was visualized  and appears normal.  There are advanced degenerative changes in the  lumbar spine with fusion of L3 through S1.     IMPRESSION:  1.  THERE IS A 2 CM LEFT LOWER LOBE MASS, SUSPICIOUS FOR MALIGNANCY.     2.  ENLARGED LEFT LOBE OF THE LIVER.     3.  THERE IS A 2.8 CM IN DIAMETER MASS LOW IN DENSITY IN WHAT APPEARS  TO BE A REMNANT OF THE LEFT OVARY ALONG THE PELVIC SIDEWALL.                                      SIGNATURE PAGE ONLY  Exam Date: Sep 08, 2017 12:08:02 AM  Author: ANNE RENTERIA  This report is final and signed         ASSESSMENT/PLAN:               ICD-10-CM    1. Abnormal CT scan, lung R91.8 PE NPET Skull Base To Mid Thigh Pet Pi     PE NPET Skull Base To Mid Thigh Pet Pi. Will call patient with results   2. Coronary artery disease involving native coronary artery of native heart without angina pectoris I25.10 nitroGLYcerin (NITROSTAT) 0.4 MG sublingual tablet was refilled   3. Acute midline low back pain without sciatica M54.5 HYDROcodone-acetaminophen (NORCO) 5-325 MG per tablet she has chronic low back pain we'll refill her Norco           R Hammad Tian MD  [facility]

## 2017-09-12 NOTE — NURSING NOTE
"Chief Complaint   Patient [place]     [place] [facility] [place] /78 (BP Location: Left arm, Patient Position: Chair, Cuff Size: Adult Large)  Pulse 105  Temp 98.1  F (36.7  C) (Tympanic)  SpO2 95%  Breastfeeding? No Estimated body mass index is 41.6 kg/(m^2) as calculated from the following:    Height as of 3/14/17: 5' 5\" (1.651 m).    Weight as of 9/8/17: 250 lb (113.4 kg).  Medication Reconciliation: complete   Kaci Delgado CMA(AAMA)   "

## 2017-09-12 NOTE — MR AVS SNAPSHOT
"              After Visit Summary   9/12/2017    Hermelinda Murray    MRN: [id number]           Patient Information     Date Of Birth          1945        Visit Information        Provider Department      9/12/2017 2:00 PM BI Tian MD [facility]        Today's Diagnoses     Abnormal CT scan, lung    -  1    Coronary artery disease involving native coronary artery of native heart without angina pectoris        Acute midline low back pain without sciatica          Care Instructions    We will call with the test.            Follow-ups after your visit        Who to contact     If you have questions or need follow up information about today's clinic visit or your schedule please contact [facility] directly at [phone].  Normal or non-critical lab and imaging results will be communicated to you by MyChart, letter or phone within 4 business days after the clinic has received the results. If you do not hear from us within 7 days, please contact the clinic through MyChart or phone. If you have a critical or abnormal lab result, we will notify you by phone as soon as possible.  Submit refill requests through CoPromote or call your pharmacy and they will forward the refill request to us. Please allow 3 business days for your refill to be completed.          Additional Information About Your Visit        MyChart Information     CoPromote lets you send messages to your doctor, view your test results, renew your prescriptions, schedule appointments and more. To sign up, go to [email or web address] . Click on \"Log in\" on the left side of the screen, which will take you to the Welcome page. Then click on \"Sign up Now\" on the right side of the page.     You will be asked to enter the access code listed below, as well as some personal information. Please follow the directions to create your username and password.     Your access code is: [email or web address]  Expires: 12/7/2017  2:08 PM     Your " access code will  in 90 days. If you need help or a new code, please call your [place] clinic or [phone].        Care EveryWhere ID     This is your Care EveryWhere ID. This could be used by other organizations to access your [place] medical records  [id number]        Your Vitals Were     Pulse Temperature Pulse Oximetry Breastfeeding?          105 98.1  F (36.7  C) (Tympanic) 95% No         Blood Pressure from Last 3 Encounters:   17 138/78   17 155/100   17 124/72    Weight from Last 3 Encounters:   17 250 lb (113.4 kg)   17 254 lb 10.1 oz (115.5 kg)   16 235 lb (106.6 kg)                 Where to get your medicines      These medications were sent to [facility] [address] [place], [place] - [address] AT [facility] [address]  [address], [facility] [address]     Phone:  [phone]     nitroGLYcerin 0.4 MG sublingual tablet         Some of these will need a paper prescription and others can be bought over the counter.  Ask your nurse if you have questions.     Bring a paper prescription for each of these medications     HYDROcodone-acetaminophen 5-325 MG per tablet          Primary Care Provider Office Phone # Fax #    R Hammad Tian [phone]       [facility] [address] [place] [address]        Equal Access to Services     [facility]: Hadii aad ku hadasho Soomaali, waaxda luqadaha, qaybta kaalmada adeegyada, jarek morrison . So [facility] [phone].    ATENCIÓN: Si habla graciela, tiene a gomez disposición servicios gratuitos de asistencia lingüística. Llame al [phone].    We comply with applicable federal civil rights laws and [place] laws. We do not discriminate on the basis of race, color, national origin, age, disability sex, sexual orientation or gender identity.            Thank you!     Thank you for choosing [facility]  for your care. Our goal is always to  provide you with excellent care. Hearing back from our patients is one way we can continue to improve our services. Please take a few minutes to complete the written survey that you may receive in the mail after your visit with us. Thank you!             Your Updated Medication List - Protect others around you: Learn how to safely use, store and throw away your medicines at www.disposemymeds.org.          This list is accurate as of: 9/12/17  2:15 PM.  Always use your most recent med list.                   Brand Name Dispense Instructions for use Diagnosis    acetaminophen 325 MG tablet    TYLENOL    100 tablet    Take 1 tablet (325 mg) by mouth every 4 hours as needed for mild pain    Degeneration of intervertebral disc of lumbosacral region       Acidophilus/Goat Milk Caps           aspirin  MG EC tablet     3 tablet    One daily    Coronary artery disease involving native coronary artery of native heart without angina pectoris       betamethasone dipropionate 0.05 % ointment    DIPROSONE    45 g    Apply to elbow and cheek as needed for psoriasis flare ups    Psoriasis       HYDROcodone-acetaminophen 5-325 MG per tablet    NORCO    60 tablet    Take 1 tablet by mouth every 6 hours as needed for moderate to severe pain Maximum 4 tablet(s) per day    Acute midline low back pain without sciatica       MULTIVITAL PO           nitroGLYcerin 0.4 MG sublingual tablet    NITROSTAT    25 tablet    1 tablet under the tongue every 5 minutes for chest discomfort    Coronary artery disease involving native coronary artery of native heart without angina pectoris       polyethylene glycol powder    MIRALAX    527 g    Take 17 g (1 capful) by mouth daily        VITAMIN C PO      Take 1,000 mg by mouth daily

## 2017-09-21 NOTE — PROGRESS NOTES
I informed patient that her PET scan is very suspicious for cancer.  I explained that we need to do a CAT scan biopsy to get tissue and she consents.  I notified radiology and we'll proceed with that test.  I didn't mention for her to see a chest surgeon and she says she will absolutely not do surgery.  She says if  this is cancer she will not do chemotherapy or radiation or surgery. After her test results come back from the biopsy she will get an appointment to see me in the clinic.

## 2017-09-21 NOTE — TELEPHONE ENCOUNTER
Please put patient on the schedule for next Tuesday at 3:45pm. Patient already notified of appointment   NORBERTO FLAHERTY

## 2017-09-26 PROBLEM — Z85.3 PERSONAL HISTORY OF MALIGNANT NEOPLASM OF BREAST: Status: ACTIVE | Noted: 2017-01-01

## 2017-09-26 NOTE — NURSING NOTE
"Chief Complaint   Patient presents with     RECHECK     Flu Shot     declined at this time       Initial /70 (BP Location: Left arm, Patient Position: Sitting, Cuff Size: Adult Regular)  Pulse 103  Temp 96.8  F (36  C) (Tympanic)  Resp 18  Ht 5' 5\" (1.651 m)  Wt 258 lb (117 kg)  SpO2 96%  BMI 42.93 kg/m2 Estimated body mass index is 42.93 kg/(m^2) as calculated from the following:    Height as of this encounter: 5' 5\" (1.651 m).    Weight as of this encounter: 258 lb (117 kg).  Medication Reconciliation: complete   Annie Goodwin MA  "

## 2017-09-26 NOTE — PROGRESS NOTES
[facility]    Hermelinda Murray, 71 year old, female presents with   Chief Complaint   Patient presents with     RECHECK. Patient recently had an abdominal CT that revealed a lung nodule.  She underwent a PET scan that showed hypermetabolic area that was suggestive of a neoplasm.  A CT biopsy was ordered and patient canceled that procedure.  She is here now to discuss options.  She also has pedal edema.  She's had a history of breast cancer but also a history of anesthesia intolerance.  Has been told by 3 different physicians that she should not have general anesthesia because of a severe reaction in recovery not waking up.she continues to smoke cigarettes.     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     Other specified pre-operative examination 11/12/2002     Paroxysmal atrial fibrillation (H) 1/19/2017     S/P total knee replacement using cement, 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     Stented coronary artery 2008    approximate date     Type 2 diabetes mellitus (H) 6/13/2014    Overview:  Diagnosis 5/14 No microalbuminuria; 6/13/14        PAST SURGICAL HISTORY:  Past Surgical History:   Procedure Laterality Date     ARTHROPLASTY KNEE Left 10/19/2015    Procedure: ARTHROPLASTY KNEE;  Surgeon: Jaren James MD;  Location: [facility] OR     BACK SURGERY      x2     BACK SURGERY      back fusion     COLONOSCOPY  5-     HYSTERECTOMY      partial     KNEE SURGERY      knee replacement, right     LAPAROSCOPIC CHOLECYSTECTOMY       low back fusion  2000    L1,2,3,4     LUMPECTOMY BREAST  2005    left     NECK SURGERY  2010     left plugged neck artery with stent placement     ORTHOPEDIC SURGERY Right     TKA     STENT  2008    coronary       MEDICATIONS:  Prior to Admission medications    Medication Sig Start Date End Date Taking? Authorizing Provider   hydrochlorothiazide (MICROZIDE) 12.5 MG capsule Take 1 capsule (12.5 mg) by mouth daily 9/26/17  Yes BI Tian MD   nitroGLYcerin (NITROSTAT) 0.4 MG sublingual tablet 1 tablet under the tongue every 5 minutes for chest discomfort 9/12/17  Yes BI Tian MD   HYDROcodone-acetaminophen (NORCO) 5-325 MG per tablet Take 1 tablet by mouth every 6 hours as needed for moderate to severe pain Maximum 4 tablet(s) per day 9/12/17  Yes BI Tian MD   polyethylene glycol (MIRALAX) powder Take 17 g (1 capful) by mouth daily 9/8/17 10/8/17 Yes Radha Bedoya MD   aspirin 325 MG EC tablet One daily 1/24/17  Yes BI Tian MD   acetaminophen (TYLENOL) 325 MG tablet Take 1 tablet (325 mg) by mouth every 4 hours as needed for mild pain 1/19/17  Yes Ethan Reid MD   betamethasone dipropionate (DIPROSONE) 0.05 % ointment Apply to elbow and cheek as needed for psoriasis flare ups 11/22/16  Yes BI Tian MD   Multiple Vitamins-Minerals (MULTIVITAL PO)    Yes Reported, Patient   Probiotic Product (ACIDOPHILUS/GOAT MILK) CAPS    Yes Reported, Patient   Ascorbic Acid (VITAMIN C PO) Take 1,000 mg by mouth daily   Yes Reported, Patient       ALLERGIES:     Allergies   Allergen Reactions     Metal [Staples]      States cheap metal     Seasonal Allergies      Seasonal allergies; nasal congestion and cough.  Per 12/18/15, has history of reacting to metal surgery staples and certain types of jewelry; allergy to cheap metal (scanned record).     Zinc        ROS:  Constitutional, neuro, ENT, endocrine, pulmonary, cardiac, gastrointestinal, genitourinary, musculoskeletal, integument and psychiatric systems are negative, except as otherwise noted.        EXAM:/70 (BP  "Location: Left arm, Patient Position: Sitting, Cuff Size: Adult Regular)  Pulse 103  Temp 96.8  F (36  C) (Tympanic)  Resp 18  Ht 5' 5\" (1.651 m)  Wt 258 lb (117 kg)  SpO2 96%  BMI 42.93 kg/m2 Body mass index is 42.93 kg/(m^2).   GENERAL APPEARANCE: healthy, alert and no distress  RESP: moving air comfortably  NEURO: Normal strength and tone, mentation intact and speech normal  PSYCH: mentation appears normal and affect normal/bright  EXTREMITY: she is trace bilateral lower extremity edema  Lab/ X-ray  No results found for this or any previous visit (from the past 24 hour(s)).    ASSESSMENT/PLAN:    ICD-10-CM    1. Abnormal CAT scan R93.8 CT Lung Mediastinum Biopsy. Visit was over 25 minutes and greater than 50% of the time was  counseling.  It was decided that she would undergo the CT biopsy to get tissue diagnosis.  I told her I would call her with the results and we will set her up to see the oncologist.  She can hear all the options and then if she chooses at that point to not proceed and treat this would be her choice     CT Lung Mediastinum Biopsy   2. Pedal edema R60.0 hydrochlorothiazide (MICROZIDE) 12.5 MG capsule one daily   3. Lung nodule R91.1 C November 1   4. Tobacco dependence syndrome F17.200 Encouraged her to quit smoking   5. Personal history of malignant neoplasm of breast Z85.3 She's had radiation in the past for her breast cancer.         YK Tian MD  September 26, 2017          "

## 2017-09-26 NOTE — MR AVS SNAPSHOT
"              After Visit Summary   2017    Hermelinda Murray    MRN: [id number]           Patient Information     Date Of Birth          1945        Visit Information        Provider Department      2017 3:45 PM BI Tian MD [facility]        Today's Diagnoses     Abnormal CAT scan    -  1    Pedal edema          Care Instructions    We will call with the CT.            Follow-ups after your visit        Who to contact     If you have questions or need follow up information about today's clinic visit or your schedule please contact [facility] directly at [phone].  Normal or non-critical lab and imaging results will be communicated to you by Dragon Portshart, letter or phone within 4 business days after the clinic has received the results. If you do not hear from us within 7 days, please contact the clinic through Dragon Portshart or phone. If you have a critical or abnormal lab result, we will notify you by phone as soon as possible.  Submit refill requests through Ohmconnect or call your pharmacy and they will forward the refill request to us. Please allow 3 business days for your refill to be completed.          Additional Information About Your Visit        MyChart Information     Ohmconnect lets you send messages to your doctor, view your test results, renew your prescriptions, schedule appointments and more. To sign up, go to [email or web address] . Click on \"Log in\" on the left side of the screen, which will take you to the Welcome page. Then click on \"Sign up Now\" on the right side of the page.     You will be asked to enter the access code listed below, as well as some personal information. Please follow the directions to create your username and password.     Your access code is: [email or web address]  Expires: 2017  2:08 PM     Your access code will  in 90 days. If you need help or a new code, please call your [facility] or [phone].        Care EveryWhere ID     " "This is your Care EveryWhere ID. This could be used by other organizations to access your [place] medical records  [id number]        Your Vitals Were     Pulse Temperature Respirations Height Pulse Oximetry BMI (Body Mass Index)    103 96.8  F (36  C) (Tympanic) 18 5' 5\" (1.651 m) 96% 42.93 kg/m2       Blood Pressure from Last 3 Encounters:   09/26/17 132/70   09/12/17 138/78   09/08/17 155/100    Weight from Last 3 Encounters:   09/26/17 258 lb (117 kg)   09/08/17 250 lb (113.4 kg)   01/19/17 254 lb 10.1 oz (115.5 kg)                 Today's Medication Changes          These changes are accurate as of: 9/26/17  4:08 PM.  If you have any questions, ask your nurse or doctor.               Start taking these medicines.        Dose/Directions    hydrochlorothiazide 12.5 MG capsule   Commonly known as:  MICROZIDE   Used for:  Pedal edema   Started by:  BI Tian MD        Dose:  12.5 mg   Take 1 capsule (12.5 mg) by mouth daily   Quantity:  30 capsule   Refills:  3            Where to get your medicines      These medications were sent to [facility] [address] [place] [address] [address] AT [facility] [address]  [address], [facility] [address]     Phone:  [phone]     hydrochlorothiazide 12.5 MG capsule                Primary Care Provider Office Phone # Fax #    BI Tian [phone]       [facility] [address]        Equal Access to Services     [facility]: Hadii aaron hyman hadashwilber [place], waaxda luqadaha, qaybta kaalmada monika, jarek mccoy. So [facility] [phone].    ATENCIÓN: Si habla español, tiene a gomez disposición servicios gratuitos de asistencia lingüística. Llame al [phone].    We comply with applicable federal civil rights laws and [place] laws. We do not discriminate on the basis of race, color, national origin, age, disability sex, sexual orientation or gender identity.       "      Thank you!     [facility] you [facility]  [facility]. Our goal is always to provide you with excellent care. Hearing back from our patients is one way we can continue to improve our services. Please take a few minutes to complete the written survey that you may receive in the mail after your visit with us. Thank you!             Your Updated Medication List - Protect others around you: Learn how to safely use, store and throw away your medicines at www.disposemymeds.org.          This list is accurate as of: 9/26/17  4:08 PM.  Always use your most recent med list.                   Brand Name Dispense Instructions for use Diagnosis    acetaminophen 325 MG tablet    TYLENOL    100 tablet    Take 1 tablet (325 mg) by mouth every 4 hours as needed for mild pain    Degeneration of intervertebral disc of lumbosacral region       Acidophilus/Goat Milk Caps           aspirin  MG EC tablet     3 tablet    One daily    Coronary artery disease involving native coronary artery of native heart without angina pectoris       betamethasone dipropionate 0.05 % ointment    DIPROSONE    45 g    Apply to elbow and cheek as needed for psoriasis flare ups    Psoriasis       hydrochlorothiazide 12.5 MG capsule    MICROZIDE    30 capsule    Take 1 capsule (12.5 mg) by mouth daily    Pedal edema       HYDROcodone-acetaminophen 5-325 MG per tablet    NORCO    60 tablet    Take 1 tablet by mouth every 6 hours as needed for moderate to severe pain Maximum 4 tablet(s) per day    Acute midline low back pain without sciatica       MULTIVITAL PO           nitroGLYcerin 0.4 MG sublingual tablet    NITROSTAT    25 tablet    1 tablet under the tongue every 5 minutes for chest discomfort    Coronary artery disease involving native coronary artery of native heart without angina pectoris       polyethylene glycol powder    MIRALAX    527 g    Take 17 g (1 capful) by mouth daily        VITAMIN C PO      Take 1,000 mg  by mouth daily

## 2017-10-10 NOTE — IP AVS SNAPSHOT
MRN:[id number]                      After Visit Summary   10/10/2017    Hermelinda Murray    MRN: [id number]           Visit Information        Provider Department      10/10/2017  9:00 AM Radiologist, Need Interventional; [facility] INTERVENTIONAL RAD           Review of your medicines      UNREVIEWED medicines. Ask your doctor about these medicines        Dose / Directions    acetaminophen 325 MG tablet   Commonly known as:  TYLENOL   Used for:  Degeneration of intervertebral disc of lumbosacral region        Dose:  325 mg   Take 1 tablet (325 mg) by mouth every 4 hours as needed for mild pain   Quantity:  100 tablet   Refills:  0       Acidophilus/Goat Milk Caps        Refills:  0       aspirin  MG EC tablet   Used for:  Coronary artery disease involving native coronary artery of native heart without angina pectoris        One daily   Quantity:  3 tablet   Refills:  0       betamethasone dipropionate 0.05 % ointment   Commonly known as:  DIPROSONE   Used for:  Psoriasis        Apply to elbow and cheek as needed for psoriasis flare ups   Quantity:  45 g   Refills:  0       hydrochlorothiazide 12.5 MG capsule   Commonly known as:  MICROZIDE   Used for:  Pedal edema        Dose:  12.5 mg   Take 1 capsule (12.5 mg) by mouth daily   Quantity:  30 capsule   Refills:  3       HYDROcodone-acetaminophen 5-325 MG per tablet   Commonly known as:  NORCO   Used for:  Acute midline low back pain without sciatica        Dose:  1 tablet   Take 1 tablet by mouth every 6 hours as needed for moderate to severe pain Maximum 4 tablet(s) per day   Quantity:  60 tablet   Refills:  0       MULTIVITAL PO        Refills:  0       nitroGLYcerin 0.4 MG sublingual tablet   Commonly known as:  NITROSTAT   Used for:  Coronary artery disease involving native coronary artery of native heart without angina pectoris        1 tablet under the tongue every 5 minutes for chest discomfort   Quantity:  25 tablet   Refills:  3        "VITAMIN C PO        Dose:  1000 mg   Take 1,000 mg by mouth daily   Refills:  0                Protect others around you: Learn how to safely use, store and throw away your medicines at www.disposemymeds.org.         Follow-ups after your visit         Care Instructions         Additional Information About Your Visit        MyChart Information     WishLinkhart lets you send messages to your doctor, view your test results, renew your prescriptions, schedule appointments and more. To sign up, go to [email or web address] . Click on \"Log in\" on the left side of the screen, which will take you to the Welcome page. Then click on \"Sign up Now\" on the right side of the page.     You will be asked to enter the access code listed below, as well as some personal information. Please follow the directions to create your username and password.     Your access code is: [email or web address]  Expires: 2017  2:08 PM     Your access code will  in 90 days. If you need help or a new code, please call your [place] clinic or [phone].        Care EveryWhere ID     This is your Care EveryWhere ID. This could be used by other organizations to access your [place] medical records  [id number]        Your Vitals Were     Blood Pressure Pulse Temperature Respirations Pulse Oximetry       188/94 106 98.1  F (36.7  C) (Tympanic) 18 95%        Primary Care Provider Office Phone # Fax #    R Hammad Tian [phone]      Equal Access to Services     SAMIRA TYSON : Hadii aaron hyman hadasho Soomaali, waaxda luqadaha, qaybta kaalmada adeegyagill, jarek morrison . So [facility] [phone].    ATENCIÓN: Si habla español, tiene a gomez disposición servicios gratuitos de asistencia lingüística. Megan al [phone].    We comply with applicable federal civil rights laws and [place] laws. We do not discriminate on the basis of race, color, national origin, age, disability, sex, sexual orientation, or gender " identity.            Thank you!     Thank you for choosing [facility] for your care. Our goal is always to provide you with excellent care. Hearing back from our patients is one way we can continue to improve our services. Please take a few minutes to complete the written survey that you may receive in the mail after you visit with us. Thank you!             Medication List: This is a list of all your medications and when to take them. Check marks below indicate your daily home schedule. Keep this list as a reference.      Medications           Morning Afternoon Evening Bedtime As Needed    acetaminophen 325 MG tablet   Commonly known as:  TYLENOL   Take 1 tablet (325 mg) by mouth every 4 hours as needed for mild pain                                Acidophilus/Goat Milk Caps                                aspirin  MG EC tablet   One daily                                betamethasone dipropionate 0.05 % ointment   Commonly known as:  DIPROSONE   Apply to elbow and cheek as needed for psoriasis flare ups                                hydrochlorothiazide 12.5 MG capsule   Commonly known as:  MICROZIDE   Take 1 capsule (12.5 mg) by mouth daily                                HYDROcodone-acetaminophen 5-325 MG per tablet   Commonly known as:  NORCO   Take 1 tablet by mouth every 6 hours as needed for moderate to severe pain Maximum 4 tablet(s) per day                                MULTIVITAL PO                                nitroGLYcerin 0.4 MG sublingual tablet   Commonly known as:  NITROSTAT   1 tablet under the tongue every 5 minutes for chest discomfort                                VITAMIN C PO   Take 1,000 mg by mouth daily

## 2017-10-10 NOTE — PLAN OF CARE
Left lung biopsy conducted with CT scan assistance, performed by Dr Sanches after consent signed and timeout conducted.  Lidocaine used at site. Patient required 2 mg morphine during procedure.  Tolerated well.  Post xray conducted Dr. Sanches visualized and patient is to stay for one hour and may be discharged after discharge instructions are reviewed.  Daughter Monique Joseph, [phone] will transport patient home.

## 2017-10-10 NOTE — PLAN OF CARE
"DISCHARGE INSTRUCTIONS  CT Scan Directed Needle Biopsy      IMPORTANT: As you prepare for discharge, the following information will help you return to your best level of health.       This Information Is About Your Follow Up Care     Call your doctor if you do not get better. Call sooner if you feel worse. You can reach your doctor by calling their clinic phone number.                                    This Information Is About Procedures    CT SCAN DIRECTED NEEDLE BIOPSY.  Today we removed some tissue to check for abnormal cells. We will examine that tissue and your doctor will call you with the results.  Your biopsy site may be tender up to one week.  You need someone to drive you home and stay with you for the next 24 hours.  You should not do any strenuous activity or heavy lifting for the next 24 hours.    The following day, you may resume your regular work/activity schedule if you are feeling alright.  You may remove your dressing in 24 hours.  After the dressing is removed you may shower.    Call your doctor if you have:     Drainage, redness, swelling, red streaks, excess pain, swelling at the biopsy site or the intravenous needle site.    Pain not helped by your pain medicine.    Chills or a fever over 101 degrees (by mouth).    Trouble breathing.    Nausea and vomiting      Light-headedness or dizziness    Any new problems or concerns.                        This Information Is About Your Illness and Diagnosis      LUNG NODULE    What is a lung nodule?   A lung nodule, or \"solitary pulmonary nodule,\" is a small, round mass in the lung.  A lung nodule is small, less than 3 centimeters in size, and is usually found \"by chance\" when an x-ray is done of the chest for another reason.  Most lung nodules are not cancer, though some are.  A lung nodule can be a sign of early lung cancer. It is important to have a nodule checked out, so that if the nodule is cancer, you can treat the cancer as early as possible.  "     What causes a lung nodule?  Many things can cause a lung nodule, including:      Cancer     Infection      Inflammation      Tapeworm      Lack of blood flow to the lung due to a blood clot      Rheumatoid arthritis      Wegener granulomatosis (a type of inflammation of small blood vessels throughout the body)    Blood vessel abnormalities    Gas or fluid collected in a cyst      Many other causes    When you go home, follow these instructions:    Don't smoke!    Keep appointments for further chest x-rays to check for changes in the nodule    Keep follow up appointments with your doctor    Avoid traveling to areas where tuberculosis or mycosis is common    Avoid exposure to substances like asbestos, radon, chromium, vinyl chloride, or nickel    Call your doctor if you have:    Any new symptoms, such as cough or trouble breathing    Any questions or concerns.            When you go home, follow these instructions:    Don't smoke!    Take good care of your teeth and see the dentist regularly.      Raise the head of your bed at home.  This helps to keep fluids from your stomach from getting into your lungs when you are lying down.    Keep follow up appointments with your doctor.    Keep follow up appointments for chest x-rays to monitor your progress.      Don't abuse alcohol or drugs.    Call your doctor if you have:     Increased cough    Change in the phlegm you are coughing up      Bloody phlegm when you cough    Trouble breathing      Night sweating    Continued weight loss    Any questions or concerns.            Do the following:    Return to your normal activity.    See your doctor regularly.    Eat a health diet.    Learn all you can about your cancer.    If you smoke, STOP!    Call your doctor if you have:    Chills or fever.    Increased shortness of breath.    Blood in your sputum.    Any change in the color of the mucus you cough up from your lungs.    Any new problems or concerns.        At home,  follow these instructions:    Rest when you feel tired or have shortness of breath.    Keep all follow-up appointments with your doctor and lab.    Eat a healthy diet and stay away from people who smoke.    Learn all you can about your type of cancer.    Call your doctor if you have:    Chills or fever.    Increased shortness of breath.    Blood in your sputum.    Any change in the color of the mucus you cough up from your lungs or Any new problems or concerns.

## 2017-10-10 NOTE — PLAN OF CARE
Interventional Radiology Procedure Notes    Procedure: left lobe lung biopsy    Radiologist:Dr Sanches    Time Out: Prior to the start of the procedure and with procedural staff participation, I verbally confirmed the patient s identity using two indicators, relevant allergies, that the procedure was appropriate and matched the consent or emergent situation, and that the correct equipment/implants were available. Immediately prior to starting the procedure I conducted the Time Out with the procedural staff and re-confirmed the patient s name, procedure, and site/side. (The Joint Commission universal protocol was followed.)  Yes    Sedation:None. Local Anesthestic used  No sedation    Estimated Blood Loss: None    Complications: None    Condition: Stable    Comments: See dictated procedure note for full details      Post procedure xray completed.  Dr Sanches visualized and patient is ok to go home after an hour.    Nelly Echavarria RN

## 2017-10-10 NOTE — PLAN OF CARE
Discharge information reviewed with patient, including, signs and symptoms to watch for and when to return to the emergency room.  Questions answered, signed in understanding.  Daughter, Jennifer, present when discharge information reviewed.  Daughter will be transporting patient home.

## 2017-10-10 NOTE — IP AVS SNAPSHOT
HI CT SCAN    [address] [address]    Phone:  [phone]    Fax:  [phone]                                       After Visit Summary   10/10/2017    Hermelinda Murray    MRN: [id number]           After Visit Summary Signature Page     I have received my discharge instructions, and my questions have been answered. I have discussed any challenges I see with this plan with the nurse or doctor.    ..........................................................................................................................................  Patient/Patient Representative Signature      ..........................................................................................................................................  Patient Representative Print Name and Relationship to Patient    ..................................................               ................................................  Date                                            Time    ..........................................................................................................................................  Reviewed by Signature/Title    ...................................................              ..............................................  Date                                                            Time

## 2017-10-12 NOTE — TELEPHONE ENCOUNTER
Patient notified of results, will see Provatas to discuss options regarding lung cancer  mitral regurgitation

## 2017-10-16 NOTE — TELEPHONE ENCOUNTER
Phoned patient and schedule oncology consult on 10/23 as she wants afternoon appointment. Will plan to do previsit phone call prior to this appointment. Denise Vazquez RN

## 2017-10-19 NOTE — TELEPHONE ENCOUNTER
"Clinic Care Coordination Contact  Care Team Conversations    Reveied patients pathology results and spoke with Dr. Walden regarding need for additional stains to help possibly determine the origin of tissue as patient has a hx of breast cancer. Stains requested. To contact pathology with expected date after they are ordered. Phoned patient and spoke with her about need for further testing on biopsy. Explained that this testing is vital with giving treatment options. Inquired about her past history of breast cancer, and she reports that \"I am so bad at time lines, 10 plus years ago.\" She couldn't recall her oncologist name, but states that she only had a lumpectomy, and 34 tx's of radiation therapy. Declined chemotherapy. Also inquired about tamoxifen, or arimidex, and patient states she did not take any pills. Inquired about treatment of this new found cancer, and patient reports \"I don't know why I am coming to see you because I am not going to ever do chemo, and as far as radiation goes to my lung I will never do that because my boob is like a rock after that.\" Spoke about knowing type of cancer, as this will at least help give prognostication, and also a referral to hospice can be made. Pt is wanting to move forward with this service indefinitely. Will plan to make referral with oncology consult visit. Pt was in agreement to this plan. RNCC reschedule pt's appt to 10/24 at 2:30 to help with pathology resulting time. Did inform patient that this appointment may have to be again rescheduled depending on testing. She was ok with this plan. To contact patient Monday. Denise Vazquez RN      "

## 2017-10-24 NOTE — NURSING NOTE
"Chief Complaint   Patient presents with     Establish Care     Lung Cancer     *_* Living Will *_*       Initial /69  Pulse 107  Temp 97.3  F (36.3  C) (Tympanic)  Resp 18  Ht 1.651 m (5' 5\")  Wt 116.6 kg (257 lb)  SpO2 96%  BMI 42.77 kg/m2 Estimated body mass index is 42.77 kg/(m^2) as calculated from the following:    Height as of this encounter: 1.651 m (5' 5\").    Weight as of this encounter: 116.6 kg (257 lb).  Medication Reconciliation: complete     Patient was assessed using the NCCN psychosocial distress thermometer. Patient rated the score as a 4. Patient rated current stressors as Family issues with granddaugter. Stressors will be brought to the attention of provider or Oncology RN Care Coordinator for a score of 6 or greater or per nurses discretion.       Janice Villa RN          "

## 2017-10-24 NOTE — MR AVS SNAPSHOT
After Visit Summary   10/24/2017    Hermelinda Murray    MRN: [id number]           Patient Information     Date Of Birth          1945        Visit Information        Provider Department      10/24/2017 1:00 PM Stan Wild MD [facility]        Today's Diagnoses     Malignant neoplasm of left breast in female, estrogen receptor positive, unspecified site of breast (H)    -  1    Malignant neoplasm of lung, unspecified laterality, unspecified part of lung (H)          Care Instructions    We would like to see you back on November 3rd.   We will schedule you for an MRI - you will have labs drawn today and we will go over the results at your follow up      When you are in need of a refill of your medications, please call your pharmacy and they will send us the request. If you have any questions please call [phone]            Follow-ups after your visit        Your next 10 appointments already scheduled     Oct 31, 2017 10:00 AM CDT   [facility]   [facility] ([facility] )    [address] [place] [id number] [phone]           Take your medicines as usual, unless your doctor tells you not to. Bring a list of your current medicines to your exam (including vitamins, minerals and over-the-counter drugs).  You will be given intravenous contrast for this exam. To prepare:   The day before your exam, drink extra fluids at least six 8-ounce glasses (unless your doctor tells you to restrict your fluids).   Have a blood test (creatinine test) within 30 days of your exam. Go to your clinic or Diagnostic Imaging Department for this test.  The MRI machine uses a strong magnet. Please wear clothes without metal (snaps, zippers). A sweatsuit works well, or we may give you a hospital gown.  Please remove any body piercings and hair extensions before you arrive. You will also remove watches, jewelry, hairpins, wallets, dentures, partial dental  plates and hearing aids. You may wear contact lenses, and you may be able to wear your rings. We have a safe place to keep your personal items, but it is safer to leave them at home.   **IMPORTANT** THE INSTRUCTIONS BELOW ARE ONLY FOR THOSE PATIENTS WHO HAVE BEEN TOLD THEY WILL RECEIVE SEDATION OR GENERAL ANESTHESIA DURING THEIR MRI PROCEDURE:  IF YOU WILL RECEIVE SEDATION (take medicine to help you relax during your exam):   You must get the medicine from your doctor before you arrive. Bring the medicine to the exam. Do not take it at home.   Arrive one hour early. Bring someone who can take you home after the test. Your medicine will make you sleepy. After the exam, you may not drive, take a bus or take a taxi by yourself.   No eating 8 hours before your exam. You may have clear liquids up until 4 hours before your exam. (Clear liquids include water, clear tea, black coffee and fruit juice without pulp.)  IF YOU WILL RECEIVE ANESTHESIA (be asleep for your exam):   Arrive 1 1/2 hours early. Bring someone who can take you home after the test. You may not drive, take a bus or take a taxi by yourself.   No eating 8 hours before your exam. You may have clear liquids up until 4 hours before your exam. (Clear liquids include water, clear tea, black coffee and fruit juice without pulp.)  Please call the Imaging Department at your exam site with any questions.            Nov 03, 2017  1:30 PM CDT   (Arrive by 1:15 PM)   Return Visit with Stan Wild MD   [facility] ([facility] )    [facility] [place] [address]   [phone]              Who to contact     If you have questions or need follow up information about today's clinic visit or your schedule please [facility] directly at [phone].  Normal or non-critical lab and imaging results will be communicated to you by MyChart, letter or phone within 4 business days after the clinic has received the  "results. If you do not hear from us within 7 days, please contact the clinic through [employer] or phone. If you have a critical or abnormal lab result, we will notify you by phone as soon as possible.  Submit refill requests through [employer] or call your pharmacy and they will forward the refill request to us. Please allow 3 business days for your refill to be completed.          Additional Information About Your Visit        [employer] Information     [employer] lets you send messages to your doctor, view your test results, renew your prescriptions, schedule appointments and more. To sign up, go to [email or web address] . Click on \"Log in\" on the left side of the screen, which will take you to the Welcome page. Then click on \"Sign up Now\" on the right side of the page.     You will be asked to enter the access code listed below, as well as some personal information. Please follow the directions to create your username and password.     Your access code is: [email or web address]  Expires: 2017  2:08 PM     Your access code will  in 90 days. If you need help or a new code, please call your [place] clinic or [phone].        Care EveryWhere ID     This is your Care EveryWhere ID. This could be used by other organizations to access your [place] medical records  [id number]        Your Vitals Were     Pulse Temperature Respirations Height Pulse Oximetry BMI (Body Mass Index)    107 97.3  F (36.3  C) (Tympanic) 18 1.651 m (5' 5\") 96% 42.77 kg/m2       Blood Pressure from Last 3 Encounters:   10/24/17 148/69   10/10/17 154/58   17 132/70    Weight from Last 3 Encounters:   10/24/17 116.6 kg (257 lb)   17 117 kg (258 lb)   17 113.4 kg (250 lb)              We Performed the Following     Ca27.29  breast tumor marker     CBC with platelets differential     CEA     Comprehensive metabolic panel     Lactate Dehydrogenase        Primary Care Provider Office Phone # Fax #    R Hammad Tian [phone] " [phone]       [facility] [place] [address] [place] [id number]        Equal Access to Services     ALEN TYSON : Hadii aad ku hadopheliawilber Dinorahali, wajuwanda lutrevormireyaha, qanamanta kaalmada monika, jarek charityin hayaashelia espinosaharley mcginnis tamiko mccoy. So [facility] [phone].    ATENCIÓN: Si habla español, tiene a gomez disposición servicios gratuitos de asistencia lingüística. Llame al [phone].    We comply with applicable federal civil rights laws and [place] laws. We do not discriminate on the basis of race, color, national origin, age, disability, sex, sexual orientation, or gender identity.            Thank you!     Thank you for choosing [facility]  for your care. Our goal is always to provide you with excellent care. Hearing back from our patients is one way we can continue to improve our services. Please take a few minutes to complete the written survey that you may receive in the mail after your visit with us. Thank you!             Your Updated Medication List - Protect others around you: Learn how to safely use, store and throw away your medicines at www.disposemymeds.org.          This list is accurate as of: 10/24/17 11:59 PM.  Always use your most recent med list.                   Brand Name Dispense Instructions for use Diagnosis    Acidophilus/Goat Milk Caps           aspirin  MG EC tablet     3 tablet    One daily    Coronary artery disease involving native coronary artery of native heart without angina pectoris       betamethasone dipropionate 0.05 % ointment    DIPROSONE    45 g    Apply to elbow and cheek as needed for psoriasis flare ups    Psoriasis       hydrochlorothiazide 12.5 MG capsule    MICROZIDE    30 capsule    Take 1 capsule (12.5 mg) by mouth daily    Pedal edema       HYDROcodone-acetaminophen 5-325 MG per tablet    NORCO    60 tablet    Take 1 tablet by mouth every 6 hours as needed for moderate to severe pain Maximum 4 tablet(s) per day    Acute midline low  back pain without sciatica       MULTIVITAL PO           nitroGLYcerin 0.4 MG sublingual tablet    NITROSTAT    25 tablet    1 tablet under the tongue every 5 minutes for chest discomfort    Coronary artery disease involving native coronary artery of native heart without angina pectoris       VITAMIN C PO      Take 1,000 mg by mouth daily

## 2017-10-24 NOTE — NURSING NOTE
Met with patient during oncology consult visit. Pt's pathology does not confirm whether it is breast or lung cancer. She will be set up for an MRI of her brain and also obtain lab work, and follow up with Dr. Wild for results. Either way, patient wants prognostication and information on homeopathic methods to control cancer. Will address at next oncology visit. Denise Vazquez RN

## 2017-10-24 NOTE — PATIENT INSTRUCTIONS
We would like to see you back on November 3rd.   We will schedule you for an MRI - you will have labs drawn today and we will go over the results at your follow up      When you are in need of a refill of your medications, please call your pharmacy and they will send us the request. If you have any questions please call [phone]

## 2017-10-25 NOTE — PROGRESS NOTES
DATE OF SERVICE:  10/24/2017.        REASON FOR CONSULTATION:  Carcinoma of unknown primary.       REQUESTING PHYSICIAN:  Dr. Hammad Tian.      HISTORY OF PRESENT ILLNESS:  Hermelinda Murray is a 71-year-old white female with a previous history of left breast cancer, who we are asked to evaluate concerning new diagnosis of carcinoma of unknown primary involving the left lung.  She had originally presented to the emergency room with complaints of abdominal pain, constipation, leg swelling and chest pain.  She was seen on 09/08/2017 with complaints of abdominal pain.  CT of the abdomen and pelvis was obtained on 09/08/2017 and revealed that there was a 2 cm left lower lobe lung mass suspicious for malignancy.  The patient was seen by Dr. Hammad Tian who ordered a PET scan that was done on 09/24 which revealed that there was a hypermetabolic 2 cm left lower lobe lung nodule and hypermetabolic enlarged left hilar nodes concerning for malignancy, recurrent disease.  The was no evidence of apparent activity beneath the diaphragm.  According to the radiologist, the left hilar node was borderline high.  CT-guided biopsy was obtained that initially was read as nonsmall cell lung carcinoma but apparently on review by the pathologist, Dr. Walden, he could not clearly delineate whether it was breast or lung or other carcinoma of unknown primary.  He is now sending the slides down to review at the [facility].  The patient had a previous history of ER positive infiltrating lobular carcinoma of the left breast diagnosed in 12/2005.  She underwent lumpectomy and sentinel node procedure on 02/2006.  She was stage T2 N1 M0.  Tumor size was 2 cm and she went on to adjuvant radiation therapy performed by Dr. Jaren Joel in 02/2006.  Otherwise, she says she is a chronic smoker.  She smoked for many years since age 14, at least a pack per day.  She continues to smoke.  She denies any cough.  She occasionally gets  dyspneic on exertion and says she suffers from chronic back pain.  She has had multiple back surgeries.  She has had 2 replacements and she says she is not very ambulatory.  Otherwise, no complaints of fevers, night sweats, weight loss, abdominal pain, chest pain.      PAST MEDICAL HISTORY:  As above, status post left total knee replacement, dermatitis, history of atrial flutter, atherosclerosis, abdominal aorta, left breast cancer as above, carotid artery stenosis, chronic pain syndrome, constipation, degeneration of intervertebral disk of lumbosacral region, type 2 diabetes mellitus, Duyputren's of palm, elevated erythrocyte sedimentation rate, hypoxia, morbid obesity, nonalcoholic steatohepatitis, osteoarthritis, osteopenia, hyperlipidemia, psoriasis disorder, rotator cuff, thrombocythemia, tobacco dependence syndrome, atrial fibrillation.      ALLERGIES:  Allergic to metal, seasonal allergies, zinc.      CURRENT MEDICATIONS:   1.  Aspirin 325 mg daily.   2.  Betamethasone dipropionate p.r.n.   3.  Multivitamin daily.   4.  Probiotic daily.   5.  Ascorbic acid, vitamin C 1000 mg daily.   6.  Hydrochlorothiazide 12.5 mg daily.   7.  Nitrostat 0.4 mg sublingual p.r.n. under the tongue.   8.  Hydrocodone/acetaminophen 1 tablet by mouth every 6 hours as needed.      SOCIAL HISTORY:  She is a chronic smoker of at least one pack per day since age 15, so at least a 50-pack-year history.  Alcohol is negative.  She worked as a nurse's aide in the past, is currently disabled and retired.      FAMILY HISTORY:  Significant for mother with stomach cancer.      REVIEW OF SYSTEMS:  As per HPI.      PHYSICAL EXAMINATION:   GENERAL:  He is an obese, elderly white female in no acute distress.   VITAL SIGNS:  Blood pressure 140/69, pulse 107, respirations 18, temperature 97.3.   HEENT:  Atraumatic, normocephalic.  Oropharynx nonerythematous.   NECK:  Supple.   LUNGS:  Clear to auscultation and percussion.   HEART:  Regular rhythm,  S1, S2 normal.   ABDOMEN:  Soft, obese with normoactive bowel sounds, no masses, nontender.   EXTREMITIES:  With 2+ ankle edema.   NEUROLOGIC:  Grossly nonfocal.      LABORATORY DATA:  Reveal CBC with white count 8.5, H&H 15.7 and 45.0, platelet count 198, ALT 56, AST 56, BUN 13, creatinine 0.6.  LDH is 277.      IMPRESSION AND PLAN:  Carcinoma of unknown primary involving the left lower lobe with a 2 cm lung nodule that was PET positive.  Pathology was inconclusive and this will be sent to the [facility] for a second opinion and review to determine the source the malignant tumor.  Suspect it is likely a nonsmall cell lung cancer as the patient has a history of tobacco abuse.  She may be a candidate for stereotactic radiosurgery is this is the only potential site of disease.  We would like to get the cytology results.  Will obtain tumor markers including CEA, CA 27-29.  The patient is against any kind of radiation, chemotherapy or surgery, but will see her after we complete the workup by obtaining an MRI of the brain.  She does complain of occasional blurred vision; to complete the metastatic workup.  We will see the patient after the MRI of the brain Seventy minutes spent with the patient; greater than half the time was spent in counseling and educating spent some time discussing the possibilities of lung malignancy versus other malignancy.  Also spent time ordering MRI of the brain, ordering lab work and followup care.         SHANDRA BLACKWELL MD             D: 10/24/2017 15:35   T: 10/24/2017 21:20   MT:       Name:     ABIMBOLA RACHEL   MRN:      [id number]        Account:      [id number]   :      1945           Visit Date:   10/24/2017      Document: [id number]       cc: BI Tian MD

## 2017-11-03 NOTE — NURSING NOTE
"Chief Complaint   Patient presents with     Cancer/recheck     left breast cancer, lung cancer       Initial /74 (BP Location: Right arm, Cuff Size: Adult Large)  Pulse 95  Temp 98.3  F (36.8  C) (Tympanic)  Resp 20  Ht 1.651 m (5' 5\")  Wt 116.6 kg (257 lb)  SpO2 96%  BMI 42.77 kg/m2 Estimated body mass index is 42.77 kg/(m^2) as calculated from the following:    Height as of this encounter: 1.651 m (5' 5\").    Weight as of this encounter: 116.6 kg (257 lb).  Medication Reconciliation: marietta Menchaca      "

## 2017-11-03 NOTE — MR AVS SNAPSHOT
"              After Visit Summary   11/3/2017    Hermelinda Murray    MRN: [id number]           Patient Information     Date Of Birth          1945        Visit Information        Provider Department      11/3/2017 1:30 PM Stan Wild MD [facility]        Today's Diagnoses     Malignant neoplasm of right lung, unspecified part of lung (H)    -  1       Follow-ups after your visit        Who to contact     If you have questions or need follow up information about today's clinic visit or your schedule please contact [facility] directly at [phone].  Normal or non-critical lab and imaging results will be communicated to you by [employer], letter or phone within 4 business days after the clinic has received the results. If you do not hear from us within 7 days, please contact the clinic through [employer] or phone. If you have a critical or abnormal lab result, we will notify you by phone as soon as possible.  Submit refill requests through [employer] or call your pharmacy and they will forward the refill request to us. Please allow 3 business days for your refill to be completed.          Additional Information About Your Visit        MyChart Information     [employer] lets you send messages to your doctor, view your test results, renew your prescriptions, schedule appointments and more. To sign up, go to [email or web address] . Click on \"Log in\" on the left side of the screen, which will take you to the Welcome page. Then click on \"Sign up Now\" on the right side of the page.     You will be asked to enter the access code listed below, as well as some personal information. Please follow the directions to create your username and password.     Your access code is: [email or web address]  Expires: 2017  2:08 PM     Your access code will  in 90 days. If you need help or a new code, please call your [facility] or [phone].        Care EveryWhere ID     This is your Care " "EveryWhere ID. This could be used by other organizations to access your [facility] medical records  [id number]        Your Vitals Were     Pulse Temperature Respirations Height Pulse Oximetry BMI (Body Mass Index)    95 98.3  F (36.8  C) (Tympanic) 20 1.651 m (5' 5\") 96% 42.77 kg/m2       Blood Pressure from Last 3 Encounters:   11/03/17 137/74   10/24/17 148/69   10/10/17 154/58    Weight from Last 3 Encounters:   11/03/17 116.6 kg (257 lb)   10/24/17 116.6 kg (257 lb)   09/26/17 117 kg (258 lb)              Today, you had the following     No orders found for display       Primary Care Provider Office Phone # Fax #    R Hammad Tian [phone]       [facility] [place] [address] [place] [address]        Equal Access to Services     ALEN TYSON : Hadii aaron charleso Sopk, waaxda luqadaha, qaybta kaalmada adeegyada, jarek morrison . So [facility] [phone].    ATENCIÓN: Si habla español, tiene a gomez disposición servicios gratuitos de asistencia lingüística. Llame al [phone].    We comply with applicable federal civil rights laws and [place] laws. We do not discriminate on the basis of race, color, national origin, age, disability, sex, sexual orientation, or gender identity.            Thank you!     Thank you for choosing [facility]  for your care. Our goal is always to provide you with excellent care. Hearing back from our patients is one way we can continue to improve our services. Please take a few minutes to complete the written survey that you may receive in the mail after your visit with us. Thank you!             Your Updated Medication List - Protect others around you: Learn how to safely use, store and throw away your medicines at www.disposemymeds.org.          This list is accurate as of: 11/3/17  5:30 PM.  Always use your most recent med list.                   Brand Name Dispense Instructions for use Diagnosis    " Acidophilus/Goat Milk Caps           aspirin  MG EC tablet     3 tablet    One daily    Coronary artery disease involving native coronary artery of native heart without angina pectoris       betamethasone dipropionate 0.05 % ointment    DIPROSONE    45 g    Apply to elbow and cheek as needed for psoriasis flare ups    Psoriasis       hydrochlorothiazide 12.5 MG capsule    MICROZIDE    30 capsule    Take 1 capsule (12.5 mg) by mouth daily    Pedal edema       HYDROcodone-acetaminophen 5-325 MG per tablet    NORCO    60 tablet    Take 1 tablet by mouth every 6 hours as needed for moderate to severe pain Maximum 4 tablet(s) per day    Acute midline low back pain without sciatica       MULTIVITAL PO           nitroGLYcerin 0.4 MG sublingual tablet    NITROSTAT    25 tablet    1 tablet under the tongue every 5 minutes for chest discomfort    Coronary artery disease involving native coronary artery of native heart without angina pectoris

## 2017-11-06 NOTE — NURSING NOTE
Clinic Care Coordination Contact  Care Team Conversations    Met with patient during result visit with Dr. Wild, and her daughter. Patient has stage III disease, and does not want to move forward with any treatment modalities. Hospice was discussed, and referral was placed. Spoke with Kt HORNER with [facility] and she will coordinate admission to hospice. Denise Vazquez RN

## 2017-11-06 NOTE — PROGRESS NOTES
DATE OF VISIT:  11/03/2017.        HISTORY OF PRESENT ILLNESS:  Hermelinda Murray returns for followup of newly diagnosed adenocarcinoma of the left lung.  We had seen her in consultation at the request of Dr. Hammad Tian on 10/24/2017.  At that time she was a 71-year-old white female with a previous history of left breast cancer who had originally presented in the emergency room with complaints of abdominal pain, constipation, leg swelling, chest pain.  She was seen on 09/08/2017, with complaints of abdominal pain.  CT of the abdomen and pelvis was obtained on 09/08/2017 and revealed there was a 2 cm left lower lobe lung mass suspicious for malignancy.  The patient was seen by Dr. Hammad Tian who ordered a PET CT that was done on 09/24 which revealed there was a hypermetabolic 2 cm left lower lobe lung nodule with hypermetabolic enlarged left hilar nodes concerning for malignancy or recurrent disease.  There was no evidence of apparent activity beneath the diaphragm.  According to the radiologist, the left hilar node was consistent with mediastinal involvement.  CT-guided biopsy was obtained.  Initially was read as as nonsmall cell lung carcinoma.  Apparently, on review by the pathologist, Dr. Roberts, could not clearly delineate whether this was breast or lung or carcinoma of unknown primary.  When we saw the patient, the slides have not been reviewed by the [facility] but since then have been reviewed by them and final diagnosis, adenocarcinoma of the lung.  The patient was a chronic smoker.  We discussed treatment options.  The patient refused surgery, radiation or chemotherapy.  She just wanted to know her prognosis was.  She comes in today for prognosis evaluation.  She says she gets occasional muscle spasm in her left scapular region, but otherwise no shortness of breath, chest pain, abdominal pain, fevers, night sweats or weight loss.      PHYSICAL EXAMINATION:   GENERAL:  She is an  elderly white female in no acute distress.   VITAL SIGNS:  Blood pressure 157/74, pulse 75, respirations 20, temperature 98.3.   HEENT:  Atraumatic, normocephalic.  Oropharynx nonerythematous.   NECK:  Supple.   LUNGS:  Clear to auscultation.   HEART:  Regular rhythm, normal S1, S2.   ABDOMEN:  Obese, soft, nontender.   EXTREMITIES:  Trace ankle edema.   NEUROLOGIC:  Nonfocal.      LABORATORY DATA:  Reveals CEA of 1774.1.  CBC:  White count 8.5, H&H 15.7 and 45.0, platelet count was 98, BUN is 10, creatinine 0.68.      IMPRESSION AND PLAN:  Stage IIIA adenocarcinoma of the left lung.  The patient will be a candidate for concurrent chemoradiotherapy.  The patient refuses any kind of chemotherapy or radiation therapy.  Her prognosis is likely survival less than 2-1/2 years.  We cannot adequately determine the survival, but patient does not want any active treatment, therefore, I recommend the patient go on hospice care.  She understands this could be a curable malignancy, but she does not want to proceed with the side effects of chemotherapy or radiation and is willing to accept hospice care.  As she will not be receiving active treatment, patient made her decision at this point and has agreed to hospice care.      40 minutes spent with the patient; greater than half the time spent in counseling, discussed the pathology results, the fact that she had adenocarcinoma of the lung, stage III and the fact that it could be treated with chemoradiotherapy.  The patient refused and does not want any more treatments or any other intervention at this point.  Time spent making a hospice referral as well.         SHANDRA BLACKWELL MD             D: 2017 16:09   T: 2017 00:51   MT:       Name:     ABIMBOLA RACHEL   MRN:      [id number]        Account:      [id number]   :      1945           Visit Date:   2017      Document: [id number]       cc: BI Tian MD

## 2017-11-13 NOTE — PROGRESS NOTES
PHYSICIAN CERTIFICATION OF TERMINAL ILLNESS FOR HOSPICE BENEFIT    November 09, 2017    Hermelinda VALDEZ Trevor    1945      Effective Date of Certification    Start Date:  11-09-17      End Date:  02-06-18    Terminal Diagnosis:    Lung cancer, non small cell, prescumed        adencocarcinoma      Secondary Diagnoses:     History of breast cancer      Prognosis Related Comorbidities:    Atrial fibrillation    Carotid stenosis    Coronary heart disease     Diabetes mellitus, type 2           Narrative Statement:  Client suffers from lung cancer and is not undergoing disease directed therapy.  She continues to decline. Client resides at home and requires assistance with ADL's.  Goals of symptom control will be met.  Because of the progressive nature of the illness and associated comorbidities, client qualifies for hospice care.             I certify that this patient is terminally ill and has a life expectancy of 6 months or less if the terminal illness runs its anticipated course.        Attestation:  I confirm that this narrative was composed by myself and is based on review of the medical record and/or examination of the patient.            Darian Copeland MD

## 2018-01-01 ENCOUNTER — OFFICE VISIT (OUTPATIENT)
Dept: FAMILY MEDICINE | Facility: OTHER | Age: 73
End: 2018-01-01
Payer: COMMERCIAL

## 2018-01-01 ENCOUNTER — MEDICAL CORRESPONDENCE (OUTPATIENT)
Dept: HEALTH INFORMATION MANAGEMENT | Facility: HOSPITAL | Age: 73
End: 2018-01-01

## 2018-01-01 ENCOUNTER — TRANSFERRED RECORDS (OUTPATIENT)
Dept: HEALTH INFORMATION MANAGEMENT | Facility: HOSPITAL | Age: 73
End: 2018-01-01

## 2018-01-01 DIAGNOSIS — H53.2 DOUBLE VISION: ICD-10-CM

## 2018-01-01 DIAGNOSIS — C34.90 MALIGNANT NEOPLASM OF LUNG, UNSPECIFIED LATERALITY, UNSPECIFIED PART OF LUNG (H): Primary | ICD-10-CM

## 2018-01-01 DIAGNOSIS — K59.00 CONSTIPATION, UNSPECIFIED CONSTIPATION TYPE: Primary | ICD-10-CM

## 2018-01-01 DIAGNOSIS — Z51.5 HOSPICE CARE PATIENT: ICD-10-CM

## 2018-01-01 PROCEDURE — 99207 ZZC NO CHARGE LOS: CPT | Mod: GV | Performed by: NURSE PRACTITIONER

## 2018-01-01 RX ORDER — ASPIRIN 81 MG
TABLET, DELAYED RELEASE (ENTERIC COATED) ORAL
Qty: 30 TABLET | Refills: 0 | Status: SHIPPED | OUTPATIENT
Start: 2018-01-01

## 2018-01-08 NOTE — PROGRESS NOTES
Hospice Follow Up Visit      History of Present Illness:  Hermelinda Murray, 72 year old, female is seen for [facility] routine hospice visit in her home as it is physically taxing for patient to leave her home. 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 Patient has declined any treatment of her newly diagnosed lung cancer and was admitted to hospice care.  Patient made a call to hospice nurse this morning with complaints of chest pain, neck pain and double vision. Patient seen with hospice nurse present. Patient states she started having right sided chest/breast discomfort that radiated to her neck and sometimes to her back. She took a nitro with no relief. Patient states that this has happened to her in the past when she had an episode of atrial fibrillation. She does have a history of atrial fib/paroxysmal atrial fib.  She has also had double vision for the past couple days. She states the double vision is present when she looks out of both eyes but not when she closes one of her eyes. She does have an appointment at [facility] on Wednesday for this. Patient is currently on antibiotic therapy for URI, cough. Patient continues to have a productive cough of clear sputum. She denies significant shortness of breath. She has no fever, chills. Hospice nurse administered morphine 10mg x2 during visit and patient reported some relief of pain.       Current Outpatient Prescriptions   Medication Sig Dispense Refill     SENNA PLUS 8.6-50 MG per tablet TAKE 2 TO 3 TABLETS BY MOUTH DAILY AS NEEDED 30 tablet 0     hydrochlorothiazide (MICROZIDE) 12.5 MG capsule Take 1 capsule (12.5 mg) by mouth daily 30 capsule 3     nitroGLYcerin (NITROSTAT) 0.4 MG sublingual tablet 1 tablet under the tongue every 5 minutes for chest discomfort (Patient not taking: Reported on 11/3/2017) 25 tablet 3     HYDROcodone-acetaminophen (NORCO) 5-325 MG per  tablet Take 1 tablet by mouth every 6 hours as needed for moderate to severe pain Maximum 4 tablet(s) per day (Patient not taking: Reported on 11/3/2017) 60 tablet 0     aspirin 325 MG EC tablet One daily 3 tablet 0     betamethasone dipropionate (DIPROSONE) 0.05 % ointment Apply to elbow and cheek as needed for psoriasis flare ups 45 g 0     Multiple Vitamins-Minerals (MULTIVITAL PO)        Probiotic Product (ACIDOPHILUS/GOAT MILK) CAPS              Allergies   Allergen Reactions     Metal [Staples]      States cheap metal     Seasonal Allergies      Seasonal allergies; nasal congestion and cough.  Per 12/18/15, has history of reacting to metal surgery staples and certain types of jewelry; allergy to cheap metal (scanned record).     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     Other specified pre-operative examination 11/12/2002     Paroxysmal atrial fibrillation (H) 1/19/2017     S/P total knee replacement using cement, 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     Stented coronary artery 2008    approximate date     Type 2 diabetes mellitus (H) 6/13/2014    Overview:  Diagnosis 5/14 No microalbuminuria; 6/13/14            Past Surgical History:   Procedure Laterality Date     ARTHROPLASTY KNEE Left 10/19/2015    Procedure: ARTHROPLASTY KNEE;  Surgeon: Jaren James MD;  Location: [facility] OR     BACK SURGERY      x2     BACK SURGERY      back fusion     COLONOSCOPY  5-     HYSTERECTOMY      partial     KNEE SURGERY      knee replacement, right     LAPAROSCOPIC CHOLECYSTECTOMY       low back fusion  2000    L1,2,3,4     LUMPECTOMY BREAST  2005    left     NECK SURGERY   2010    left plugged neck artery with stent placement     ORTHOPEDIC SURGERY Right     TKA     STENT  2008    coronary           Family History:  Family History   Problem Relation Age of Onset     CANCER Mother 74     unsure where it began; cause of death     Other - See Comments Father 69     myocardial infarction; cause of death     HEART DISEASE Sister      disease     HEART DISEASE Brother      disease         Social History:  Social History     Social History     Marital status: Single     Spouse name: N/A     Number of children: N/A     Years of education: N/A     Occupational History     Not on file.     Social History Main Topics     Smoking status: Current Some Day Smoker     Packs/day: 0.50     Years: 55.00     Types: Cigarettes     Smokeless tobacco: Never Used      Comment: tried to quit, no passive exposure, longest tobacco free: 2 months     Alcohol use No      Comment: quit 2002     Drug use: No     Sexual activity: No     Other Topics Concern     Blood Transfusions No     Caffeine Concern Yes     4 cups coffee daily     Social History Narrative           Palliative Review of Systems:       Symptoms: Pain is right sided chest/breast pain that radiates to neck and occasionally to her back, patient was given morphine which offered some relief of pain . Patient  notes mild dyspnea which is worse with activity, productive cough, she has had some recent nausea, no vomiting, no diarrhea, no constipation, no oral symptoms, no urinary symptoms, no skin problems.  Patient notes increasing fatigue, no sedation, mild anxiety, no depression, no cognitive/memory problems.      Concerns/worries:  Patient concerned about recent change in vision, discussed possible causes, including the possibility of it being that her cancer has spread to her brain. Patient will be seen for eye exam this week           Physical Examination:  Physical Exam  GENERAL: 72 year old white female, in no acute distress  VITALS: BP: 120/78   P: 120-140 irregular  R: 18  T:  97.2   O2 95% on RA  NECK: supple, without adenopathy  HEART: heart rate irregular,  tacycardic. No murmur  LUNGS: clear to auscultation bilaterally. Normal respiratory effort  EXTREMITIES: No edema noted. Peripheral pulses palpable     Laboratory/Imaging:  Hospital Pathology on 10/27/2017   Component Date Value Ref Range Status     Copath Report 10/27/2017    Final                    Value:Patient Name: ABIMBOLA RACHEL  MR#: -[id number]  Specimen #: [id number]  Collected: 10/27/2017  Received: 10/27/2017  Reported: 10/28/2017 09:14  Ordering Phy(s): KRISTINA COHN  Copy To: [facility]  [address] [place] [address]  Phone: [phone]  Fax: [phone]    For improved result formatting, select 'View Enhanced Report Format'  under Linked Documents section.    TEST(S):  A: 3 Slides, case #[id number]  B: 8 Slides, case #[id number]    FINAL DIAGNOSIS:  I. CASE FROM [place], [place] ([id number],  OBTAINED 12/27/2005):  LEFT BREAST, BIOPSY:  - INVASIVE LOBULAR CARCINOMA, Citlaly grade 2 (of 3), measuring at  least 1.2 cm in greatest linear extent.  - Perineural invasion is present.  - Tumor is positive for estrogen receptor (>97%, strong intensity) and  progesterone receptor (approx. 5%, weak intensity); Her2/najma gene is not  amplified by FISH (see case [id number]).    II. CASE FROM [place], [place]                           ([id number],  OBTAINED 10/10/2017):  LEFT LUNG, FINE NEEDLE ASPIRATE:  - POSITIVE FOR MALIGNANCY - NON-SMALL CELL CARCINOMA, FAVOR  ADENOCARCINOMA, see comment.    COMMENT:  Case II: The lung biopsy shows non-small cell carcinoma and  adenocarcinoma is favored. The included immunostains for review show  that the neoplastic cells are diffusely and strongly positive for CK7  and are negative for CK20, mammaglobin, BRST-2, estrogen and  progesterone receptors. These findings are not consistent  "with  previously diagnosed lobular carcinoma of breast in this patient. Per  report, the neoplastic cells also demonstrate significant positive  staining for TTF-1 and are negative for p40 (these stains are not  included for review). The reported immunophenotype is suggestive of  primary lung adenocarcinoma in the appropriate clinical context.  Clinical and imaging correlation is necessary to exclude other potential  sites of origin.    I have personally reviewed all specimens and/or slides, including                           the  listed special stains, and used them with my medical judgement to  determine or confirm the final diagnosis.    Electronically signed out by:  Wade Jimenez M.D., [facility]    CLINICAL HISTORY:  The patient is a 71-year-old female with history of invasive lobular  carcinoma of the left breast, presenting with left lung mass.    GROSS:  Received from [facility] in [place], [place] are 3 stained  slides labeled [id number] (obtained 12/27/2005), 8  stained slides labeled [id number] (obtained 10/10/2017), and copies of the  referring pathologist's reports with patient identifying information.  All slides are returned.    MICROSCOPIC:  Microscopic examination was performed.    CPT Codes:  A: 91312-LJX0  B: 65783-EIR1    TESTING LAB LOCATION:  [facility]  [facility] [address], [address]   [id number] [phone]    COLLECTION SITE:  Client:  [facility]  Location:                             (F)    Resident  [id number]             Assessment/Plan:  Lung Cancer  Patient made call to hospice nurse this morning complaining of right sided chest pain that radiates to neck and occasionally to back. She reports that she is \"jsut not feeling well\". She had some nausea yesterday, but none today.  Patient reported taking a nitro with no relief. She was found to have an irregular heart rate " and tachycardia on assessment.  Patient has a newly diagnosed lung cancer for which she refused any treatment. Discussed with patient that this could be due to progression of her lung cancer. Hospice nurse gave patient morphine 20mg at visit and patient did reports some relief of pain. Patient also has a history of atrial fibrillation and feels that she may be having episodes of this again. Will have patient start metoprolol 25mg daily and will have hospice nurse assess effectiveness of medication.     Double vision   Patient reports new onset of double vision for about the past 5 days. She states she only has the double vision when both eyes are open. She states her vision is normal when she tilts her head to one side or the other. She will be seen at [facility] on Wednesday for evaluation. We did discuss the possibility of this being metastatic disease. She is aware that this is a possibility. Will have hospice nurse continue to assess and will wait for evaluation from opthalmology       Attestation:   Total time spent on review of pertinent clinical information, patient visit, counseling, and coordination of care:  45 minutes.     Yamile ALCANTARA, CNP

## 2018-01-08 NOTE — MR AVS SNAPSHOT
"              After Visit Summary   2018    Hermelinda Murray    MRN: [id number]           Patient Information     Date Of Birth          1945        Visit Information        Provider Department      2018 3:05 PM Yamile Jaquez APRN CNP [facility]        Today's Diagnoses     Malignant neoplasm of lung, unspecified laterality, unspecified part of lung (H)    -  1    Double vision        Hospice care patient           Follow-ups after your visit        Who to contact     If you have questions or need follow up information about today's clinic visit or your schedule please contact [facility] directly at [phone].  Normal or non-critical lab and imaging results will be communicated to you by Rayneerhart, letter or phone within 4 business days after the clinic has received the results. If you do not hear from us within 7 days, please contact the clinic through Rayneerhart or phone. If you have a critical or abnormal lab result, we will notify you by phone as soon as possible.  Submit refill requests through [employer] or call your pharmacy and they will forward the refill request to us. Please allow 3 business days for your refill to be completed.          Additional Information About Your Visit        MyChart Information     [employer] lets you send messages to your doctor, view your test results, renew your prescriptions, schedule appointments and more. To sign up, go to [email or web address] . Click on \"Log in\" on the left side of the screen, which will take you to the Welcome page. Then click on \"Sign up Now\" on the right side of the page.     You will be asked to enter the access code listed below, as well as some personal information. Please follow the directions to create your username and password.     Your access code is: [email or web address]  Expires: 2018 11:14 AM     Your access code will  in 90 days. If you need help or a new code, please call your [facility] or " [phone].        Care EveryWhere ID     This is your Care EveryWhere ID. This could be used by other organizations to access your [facility] medical records  [id number]         Blood Pressure from Last 3 Encounters:   11/03/17 137/74   10/24/17 148/69   10/10/17 154/58    Weight from Last 3 Encounters:   11/03/17 257 lb (116.6 kg)   10/24/17 257 lb (116.6 kg)   09/26/17 258 lb (117 kg)              Today, you had the following     No orders found for display       Primary Care Provider Office Phone # Fax #    R Hammad Tian [phone]       [facility] [place] [address] [place] [address]        Equal Access to Services     ALEN TYSON : Hadii aaron charleso Sopk, waaxda luqadaha, qaybta kaalmada adeegyada, jarek bernstein hayjosué morrison . So [facility] [phone].    ATENCIÓN: Si habla español, tiene a gomez disposición servicios gratuitos de asistencia lingüística. Llame al [phone].    We comply with applicable federal civil rights laws and [place] laws. We do not discriminate on the basis of race, color, national origin, age, disability, sex, sexual orientation, or gender identity.            Thank you!     Thank you for choosing [facility]  for your care. Our goal is always to provide you with excellent care. Hearing back from our patients is one way we can continue to improve our services. Please take a few minutes to complete the written survey that you may receive in the mail after your visit with us. Thank you!             Your Updated Medication List - Protect others around you: Learn how to safely use, store and throw away your medicines at www.disposemymeds.org.          This list is accurate as of: 1/8/18 11:59 PM.  Always use your most recent med list.                   Brand Name Dispense Instructions for use Diagnosis    Acidophilus/Goat Milk Caps           aspirin  MG EC tablet     3 tablet    One daily    Coronary artery  disease involving native coronary artery of native heart without angina pectoris       betamethasone dipropionate 0.05 % ointment    DIPROSONE    45 g    Apply to elbow and cheek as needed for psoriasis flare ups    Psoriasis       hydrochlorothiazide 12.5 MG capsule    MICROZIDE    30 capsule    Take 1 capsule (12.5 mg) by mouth daily    Pedal edema       HYDROcodone-acetaminophen 5-325 MG per tablet    NORCO    60 tablet    Take 1 tablet by mouth every 6 hours as needed for moderate to severe pain Maximum 4 tablet(s) per day    Acute midline low back pain without sciatica       MULTIVITAL PO           nitroGLYcerin 0.4 MG sublingual tablet    NITROSTAT    25 tablet    1 tablet under the tongue every 5 minutes for chest discomfort    Coronary artery disease involving native coronary artery of native heart without angina pectoris       SENNA PLUS 8.6-50 MG per tablet   Generic drug:  senna-docusate     30 tablet    TAKE 2 TO 3 TABLETS BY MOUTH DAILY AS NEEDED    Constipation, unspecified constipation type

## 2020-01-15 NOTE — TELEPHONE ENCOUNTER
Patient called and reminded of procedure tomorrow morning.  To be here at 0800 for 0900 procedure.  Reviewed when she last took ASA (last Wednesday 10/4), NPO after midnight (including coffee; only small sip with morning meds), and need for ride home after procedure.    
none

## 2021-05-24 NOTE — MR AVS SNAPSHOT
"              After Visit Summary   3/17/2017    Hermelinda Murray    MRN: [id number]           Patient Information     Date Of Birth          1945        Visit Information        Provider Department      3/17/2017 3:00 PM Darian Copeland MD [facility]        Today's Diagnoses     Spinal stenosis of lumbar region with neurogenic claudication    -  1      Care Instructions    Radiology will call to schedule Pain Management consultation.          Follow-ups after your visit        Follow-up notes from your care team     Return if symptoms worsen or fail to improve.      Your next 10 appointments already scheduled     Mar 22, 2017 11:30 AM CDT   Radiology with Need Interventional Radiologist, [facility] FLUOROSCOPY   [facility] ([facility] )    [address] [place] [id number] [phone]              Who to contact     If you have questions or need follow up information about today's clinic visit or your schedule please contact [facility] directly at [phone].  Normal or non-critical lab and imaging results will be communicated to you by MyChart, letter or phone within 4 business days after the clinic has received the results. If you do not hear from us within 7 days, please contact the clinic through [email or web address] or phone. If you have a critical or abnormal lab result, we will notify you by phone as soon as possible.  Submit refill requests through [employer] or call your pharmacy and they will forward the refill request to us. Please allow 3 business days for your refill to be completed.          Additional Information About Your Visit        [email or web address] Information     [employer] lets you send messages to your doctor, view your test results, renew your prescriptions, schedule appointments and more. To sign up, go to [email or web address] . Click on \"Log in\" on the left side of the screen, which will take you to the Welcome page. Then click on \"Sign up Now\" on the " FOCAL - PT PREFERS OVER KETURAH/ANTI-VEGF - SOME AREAS OF EXTRAFOVEAL EDEMA/LIPID. right side of the page.     You will be asked to enter the access code listed below, as well as some personal information. Please follow the directions to create your username and password.     Your access code is: [email or web address]  Expires: 2017  2:19 PM     Your access code will  in 90 days. If you need help or a new code, please call your [facility] or [phone].        Care EveryWhere ID     This is your Care EveryWhere ID. This could be used by other organizations to access your [facility] medical records  [id number]        Your Vitals Were     Pulse Respirations Pulse Oximetry             110 20 91%          Blood Pressure from Last 3 Encounters:   17 124/72   17 148/82   17 (!) 142/103    Weight from Last 3 Encounters:   17 254 lb 10.1 oz (115.5 kg)   16 235 lb (106.6 kg)   16 245 lb (111.1 kg)              We Performed the Following     [facility] Pain Management ([facility])        Primary Care Provider Office Phone # Fax #    R Hammad Tian [phone]       [facility] [place] [address] [place] [address]  [place] [address]        Thank you!     Thank you for choosing [facility] [place]  for your care. Our goal is always to provide you with excellent care. Hearing back from our patients is one way we can continue to improve our services. Please take a few minutes to complete the written survey that you may receive in the mail after your visit with us. Thank you!             Your Updated Medication List - Protect others around you: Learn how to safely use, store and throw away your medicines at www.disposemymeds.org.          This list is accurate as of: 3/17/17 11:59 PM.  Always use your most recent med list.                   Brand Name Dispense Instructions for use    acetaminophen 325 MG tablet    TYLENOL    100 tablet    Take 1 tablet (325 mg) by mouth every 4 hours as needed for mild pain       Acidophilus/Goat Milk Caps          aspirin   MG EC tablet     3 tablet    One daily       betamethasone dipropionate 0.05 % ointment    DIPROSONE    45 g    Apply to elbow and cheek as needed for psoriasis flare ups       HYDROcodone-acetaminophen 5-325 MG per tablet    NORCO    60 tablet    Take 1 tablet by mouth every 6 hours as needed for moderate to severe pain Maximum 4 tablet(s) per day       MULTIVITAL PO          nitroglycerin 0.4 MG sublingual tablet    NITROSTAT    25 tablet    1 tablet under the tongue every 5 minutes for chest discomfort       Vitamin B-12 5000 MCG Subl      Twice a week per pt.       VITAMIN C PO      Take 1,000 mg by mouth daily

## 2022-12-07 NOTE — LETTER
[facility] [place]  [address]  [place] [address]  Phone: [phone]    September 12, 2017        Hermelinda Murray  [facility] [address] [place] [id number]          To whom it may concern:    RE: Hermelinda Murray    Patient was seen and treated today at our clinic. She has serious lung disease and needs easier access to her car.    Please contact me for questions or concerns.      Sincerely,        BI Tian MD   06-Dec-2022 23:52